# Patient Record
Sex: FEMALE | Race: WHITE | NOT HISPANIC OR LATINO | Employment: FULL TIME | URBAN - METROPOLITAN AREA
[De-identification: names, ages, dates, MRNs, and addresses within clinical notes are randomized per-mention and may not be internally consistent; named-entity substitution may affect disease eponyms.]

---

## 2017-02-08 ENCOUNTER — CONVERSION ENCOUNTER (OUTPATIENT)
Dept: GENERAL RADIOLOGY | Facility: HOSPITAL | Age: 42
End: 2017-02-08

## 2018-06-19 ENCOUNTER — OFFICE VISIT CONVERTED (OUTPATIENT)
Dept: FAMILY MEDICINE CLINIC | Facility: CLINIC | Age: 43
End: 2018-06-19
Attending: PHYSICIAN ASSISTANT

## 2018-12-20 ENCOUNTER — CONVERSION ENCOUNTER (OUTPATIENT)
Dept: FAMILY MEDICINE CLINIC | Facility: CLINIC | Age: 43
End: 2018-12-20

## 2018-12-20 ENCOUNTER — OFFICE VISIT CONVERTED (OUTPATIENT)
Dept: FAMILY MEDICINE CLINIC | Facility: CLINIC | Age: 43
End: 2018-12-20
Attending: PHYSICIAN ASSISTANT

## 2019-01-10 ENCOUNTER — HOSPITAL ENCOUNTER (OUTPATIENT)
Dept: GENERAL RADIOLOGY | Facility: HOSPITAL | Age: 44
Discharge: HOME | End: 2019-01-10
Attending: OBSTETRICS & GYNECOLOGY

## 2019-04-12 ENCOUNTER — HOSPITAL ENCOUNTER (OUTPATIENT)
Dept: LAB | Facility: HOSPITAL | Age: 44
Discharge: HOME | End: 2019-04-12
Attending: PHYSICIAN ASSISTANT

## 2019-04-12 LAB
25(OH)D3 SERPL-MCNC: 22.6 NG/ML (ref 30–100)
ALBUMIN SERPL-MCNC: 4.2 G/DL (ref 3.5–5)
ALBUMIN/GLOB SERPL: 1.2 {RATIO} (ref 1.4–2.6)
ALP SERPL-CCNC: 92 U/L (ref 42–98)
ALT SERPL-CCNC: 30 U/L (ref 10–40)
ANION GAP SERPL CALC-SCNC: 16 MMOL/L (ref 8–19)
APPEARANCE UR: CLEAR
AST SERPL-CCNC: 14 U/L (ref 15–50)
BASOPHILS # BLD AUTO: 0.06 10*3/UL (ref 0–0.2)
BASOPHILS NFR BLD AUTO: 0.6 % (ref 0–3)
BILIRUB SERPL-MCNC: 0.63 MG/DL (ref 0.2–1.3)
BILIRUB UR QL: NEGATIVE
BUN SERPL-MCNC: 11 MG/DL (ref 5–25)
BUN/CREAT SERPL: 18 {RATIO} (ref 6–20)
CALCIUM SERPL-MCNC: 9.1 MG/DL (ref 8.7–10.4)
CHLORIDE SERPL-SCNC: 100 MMOL/L (ref 99–111)
CHOLEST SERPL-MCNC: 173 MG/DL (ref 107–200)
CHOLEST/HDLC SERPL: 3.4 {RATIO} (ref 3–6)
COLOR UR: YELLOW
CONV ABS IMM GRAN: 0.06 10*3/UL (ref 0–0.2)
CONV BACTERIA: NEGATIVE
CONV CO2: 25 MMOL/L (ref 22–32)
CONV COLLECTION SOURCE (UA): ABNORMAL
CONV HYALINE CASTS IN URINE MICRO: ABNORMAL /[LPF]
CONV IMMATURE GRAN: 0.6 % (ref 0–1.8)
CONV TOTAL PROTEIN: 7.6 G/DL (ref 6.3–8.2)
CONV UROBILINOGEN IN URINE BY AUTOMATED TEST STRIP: 1 {EHRLICHU}/DL (ref 0.1–1)
CREAT UR-MCNC: 0.61 MG/DL (ref 0.5–0.9)
DEPRECATED RDW RBC AUTO: 44.2 FL (ref 36.4–46.3)
EOSINOPHIL # BLD AUTO: 0.18 10*3/UL (ref 0–0.7)
EOSINOPHIL # BLD AUTO: 1.8 % (ref 0–7)
ERYTHROCYTE [DISTWIDTH] IN BLOOD BY AUTOMATED COUNT: 13.6 % (ref 11.7–14.4)
GFR SERPLBLD BASED ON 1.73 SQ M-ARVRAT: >60 ML/MIN/{1.73_M2}
GLOBULIN UR ELPH-MCNC: 3.4 G/DL (ref 2–3.5)
GLUCOSE SERPL-MCNC: 96 MG/DL (ref 65–99)
GLUCOSE UR QL: NEGATIVE MG/DL
HBA1C MFR BLD: 14.3 G/DL (ref 12–16)
HCT VFR BLD AUTO: 44.4 % (ref 37–47)
HDLC SERPL-MCNC: 51 MG/DL (ref 40–60)
HGB UR QL STRIP: NEGATIVE
KETONES UR QL STRIP: 40 MG/DL
LDLC SERPL CALC-MCNC: 107 MG/DL (ref 70–100)
LEUKOCYTE ESTERASE UR QL STRIP: ABNORMAL
LYMPHOCYTES # BLD AUTO: 1.8 10*3/UL (ref 1–5)
MAGNESIUM SERPL-MCNC: 2.01 MG/DL (ref 1.6–2.3)
MCH RBC QN AUTO: 28.7 PG (ref 27–31)
MCHC RBC AUTO-ENTMCNC: 32.2 G/DL (ref 33–37)
MCV RBC AUTO: 89.2 FL (ref 81–99)
MONOCYTES # BLD AUTO: 0.85 10*3/UL (ref 0.2–1.2)
MONOCYTES NFR BLD AUTO: 8.7 % (ref 3–10)
NEUTROPHILS # BLD AUTO: 6.87 10*3/UL (ref 2–8)
NEUTROPHILS NFR BLD AUTO: 70 % (ref 30–85)
NITRITE UR QL STRIP: NEGATIVE
NRBC CBCN: 0 % (ref 0–0.7)
OSMOLALITY SERPL CALC.SUM OF ELEC: 283 MOSM/KG (ref 273–304)
PH UR STRIP.AUTO: 6.5 [PH] (ref 5–8)
PLATELET # BLD AUTO: 271 10*3/UL (ref 130–400)
PMV BLD AUTO: 11 FL (ref 9.4–12.3)
POTASSIUM SERPL-SCNC: 4.2 MMOL/L (ref 3.5–5.3)
PROT UR QL: NEGATIVE MG/DL
RBC # BLD AUTO: 4.98 10*6/UL (ref 4.2–5.4)
RBC #/AREA URNS HPF: ABNORMAL /[HPF]
SODIUM SERPL-SCNC: 137 MMOL/L (ref 135–147)
SP GR UR: 1.02 (ref 1–1.03)
SQUAMOUS SPT QL MICRO: ABNORMAL /[HPF]
T4 FREE SERPL-MCNC: 1.5 NG/DL (ref 0.9–1.8)
TRIGL SERPL-MCNC: 75 MG/DL (ref 40–150)
TSH SERPL-ACNC: 2.78 M[IU]/L (ref 0.27–4.2)
URATE SERPL-MCNC: 8 MG/DL (ref 2.5–7.5)
VARIANT LYMPHS NFR BLD MANUAL: 18.3 % (ref 20–45)
VLDLC SERPL-MCNC: 15 MG/DL (ref 5–37)
WBC # BLD AUTO: 9.82 10*3/UL (ref 4.8–10.8)
WBC #/AREA URNS HPF: ABNORMAL /[HPF]

## 2019-06-20 ENCOUNTER — HOSPITAL ENCOUNTER (OUTPATIENT)
Dept: FAMILY MEDICINE CLINIC | Facility: CLINIC | Age: 44
Discharge: HOME | End: 2019-06-20
Attending: PHYSICIAN ASSISTANT

## 2019-06-20 ENCOUNTER — OFFICE VISIT CONVERTED (OUTPATIENT)
Dept: FAMILY MEDICINE CLINIC | Facility: CLINIC | Age: 44
End: 2019-06-20
Attending: PHYSICIAN ASSISTANT

## 2019-06-22 LAB — BACTERIA UR CULT: NORMAL

## 2020-01-31 ENCOUNTER — HOSPITAL ENCOUNTER (OUTPATIENT)
Dept: LAB | Facility: HOSPITAL | Age: 45
Discharge: HOME | End: 2020-01-31
Attending: PHYSICIAN ASSISTANT

## 2020-01-31 LAB
25(OH)D3 SERPL-MCNC: 20.2 NG/ML (ref 30–100)
ALBUMIN SERPL-MCNC: 4.3 G/DL (ref 3.5–5)
ALBUMIN/GLOB SERPL: 1.2 {RATIO} (ref 1.4–2.6)
ALP SERPL-CCNC: 79 U/L (ref 42–98)
ALT SERPL-CCNC: 25 U/L (ref 10–40)
ANION GAP SERPL CALC-SCNC: 19 MMOL/L (ref 8–19)
APPEARANCE UR: CLEAR
AST SERPL-CCNC: 14 U/L (ref 15–50)
BASOPHILS # BLD AUTO: 0.05 10*3/UL (ref 0–0.2)
BASOPHILS NFR BLD AUTO: 0.5 % (ref 0–3)
BILIRUB SERPL-MCNC: 0.53 MG/DL (ref 0.2–1.3)
BILIRUB UR QL: NEGATIVE
BUN SERPL-MCNC: 14 MG/DL (ref 5–25)
BUN/CREAT SERPL: 20 {RATIO} (ref 6–20)
CALCIUM SERPL-MCNC: 9.3 MG/DL (ref 8.7–10.4)
CHLORIDE SERPL-SCNC: 100 MMOL/L (ref 99–111)
CHOLEST SERPL-MCNC: 163 MG/DL (ref 107–200)
CHOLEST/HDLC SERPL: 3.5 {RATIO} (ref 3–6)
COLOR UR: YELLOW
CONV ABS IMM GRAN: 0.05 10*3/UL (ref 0–0.2)
CONV CO2: 22 MMOL/L (ref 22–32)
CONV COLLECTION SOURCE (UA): ABNORMAL
CONV IMMATURE GRAN: 0.5 % (ref 0–1.8)
CONV TOTAL PROTEIN: 7.8 G/DL (ref 6.3–8.2)
CONV UROBILINOGEN IN URINE BY AUTOMATED TEST STRIP: 0.2 {EHRLICHU}/DL (ref 0.1–1)
CREAT UR-MCNC: 0.69 MG/DL (ref 0.5–0.9)
DEPRECATED RDW RBC AUTO: 45.1 FL (ref 36.4–46.3)
EOSINOPHIL # BLD AUTO: 0.12 10*3/UL (ref 0–0.7)
EOSINOPHIL # BLD AUTO: 1.3 % (ref 0–7)
ERYTHROCYTE [DISTWIDTH] IN BLOOD BY AUTOMATED COUNT: 13.6 % (ref 11.7–14.4)
EST. AVERAGE GLUCOSE BLD GHB EST-MCNC: 108 MG/DL
GFR SERPLBLD BASED ON 1.73 SQ M-ARVRAT: >60 ML/MIN/{1.73_M2}
GLOBULIN UR ELPH-MCNC: 3.5 G/DL (ref 2–3.5)
GLUCOSE SERPL-MCNC: 91 MG/DL (ref 65–99)
GLUCOSE UR QL: NEGATIVE MG/DL
HBA1C MFR BLD: 5.4 % (ref 3.5–5.7)
HCT VFR BLD AUTO: 45 % (ref 37–47)
HDLC SERPL-MCNC: 46 MG/DL (ref 40–60)
HGB BLD-MCNC: 14.6 G/DL (ref 12–16)
HGB UR QL STRIP: NEGATIVE
KETONES UR QL STRIP: 15 MG/DL
LDLC SERPL CALC-MCNC: 100 MG/DL (ref 70–100)
LEUKOCYTE ESTERASE UR QL STRIP: NEGATIVE
LYMPHOCYTES # BLD AUTO: 1.85 10*3/UL (ref 1–5)
LYMPHOCYTES NFR BLD AUTO: 19.3 % (ref 20–45)
MCH RBC QN AUTO: 29.3 PG (ref 27–31)
MCHC RBC AUTO-ENTMCNC: 32.4 G/DL (ref 33–37)
MCV RBC AUTO: 90.4 FL (ref 81–99)
MONOCYTES # BLD AUTO: 0.69 10*3/UL (ref 0.2–1.2)
MONOCYTES NFR BLD AUTO: 7.2 % (ref 3–10)
NEUTROPHILS # BLD AUTO: 6.81 10*3/UL (ref 2–8)
NEUTROPHILS NFR BLD AUTO: 71.2 % (ref 30–85)
NITRITE UR QL STRIP: NEGATIVE
NRBC CBCN: 0 % (ref 0–0.7)
OSMOLALITY SERPL CALC.SUM OF ELEC: 284 MOSM/KG (ref 273–304)
PH UR STRIP.AUTO: 6.5 [PH] (ref 5–8)
PLATELET # BLD AUTO: 275 10*3/UL (ref 130–400)
PMV BLD AUTO: 11.1 FL (ref 9.4–12.3)
POTASSIUM SERPL-SCNC: 4.4 MMOL/L (ref 3.5–5.3)
PROT UR QL: NEGATIVE MG/DL
RBC # BLD AUTO: 4.98 10*6/UL (ref 4.2–5.4)
SODIUM SERPL-SCNC: 137 MMOL/L (ref 135–147)
SP GR UR: 1.01 (ref 1–1.03)
T4 FREE SERPL-MCNC: 1.4 NG/DL (ref 0.9–1.8)
TRIGL SERPL-MCNC: 83 MG/DL (ref 40–150)
TSH SERPL-ACNC: 1.94 M[IU]/L (ref 0.27–4.2)
VLDLC SERPL-MCNC: 17 MG/DL (ref 5–37)
WBC # BLD AUTO: 9.57 10*3/UL (ref 4.8–10.8)

## 2020-02-04 ENCOUNTER — OFFICE VISIT CONVERTED (OUTPATIENT)
Dept: FAMILY MEDICINE CLINIC | Facility: CLINIC | Age: 45
End: 2020-02-04
Attending: PHYSICIAN ASSISTANT

## 2020-02-04 ENCOUNTER — CONVERSION ENCOUNTER (OUTPATIENT)
Dept: FAMILY MEDICINE CLINIC | Facility: CLINIC | Age: 45
End: 2020-02-04

## 2020-02-04 LAB — T3REVERSE SERPL-MCNC: 28.5 NG/DL (ref 9.2–24.1)

## 2020-02-25 ENCOUNTER — HOSPITAL ENCOUNTER (OUTPATIENT)
Dept: GENERAL RADIOLOGY | Facility: HOSPITAL | Age: 45
Discharge: HOME | End: 2020-02-25
Attending: PHYSICIAN ASSISTANT

## 2020-08-04 ENCOUNTER — OFFICE VISIT CONVERTED (OUTPATIENT)
Dept: FAMILY MEDICINE CLINIC | Facility: CLINIC | Age: 45
End: 2020-08-04
Attending: PHYSICIAN ASSISTANT

## 2020-09-18 ENCOUNTER — HOSPITAL ENCOUNTER (OUTPATIENT)
Dept: LAB | Facility: HOSPITAL | Age: 45
Discharge: HOME | End: 2020-09-18
Attending: PHYSICIAN ASSISTANT

## 2020-09-18 LAB
25(OH)D3 SERPL-MCNC: 25.9 NG/ML (ref 30–100)
ALBUMIN SERPL-MCNC: 4.2 G/DL (ref 3.5–5)
ALBUMIN/GLOB SERPL: 1.4 {RATIO} (ref 1.4–2.6)
ALP SERPL-CCNC: 84 U/L (ref 42–98)
ALT SERPL-CCNC: 20 U/L (ref 10–40)
ANION GAP SERPL CALC-SCNC: 15 MMOL/L (ref 8–19)
APPEARANCE UR: CLEAR
AST SERPL-CCNC: 12 U/L (ref 15–50)
BASOPHILS # BLD AUTO: 0.05 10*3/UL (ref 0–0.2)
BASOPHILS NFR BLD AUTO: 0.5 % (ref 0–3)
BILIRUB SERPL-MCNC: 0.7 MG/DL (ref 0.2–1.3)
BILIRUB UR QL: NEGATIVE
BUN SERPL-MCNC: 11 MG/DL (ref 5–25)
BUN/CREAT SERPL: 15 {RATIO} (ref 6–20)
CALCIUM SERPL-MCNC: 9.4 MG/DL (ref 8.7–10.4)
CHLORIDE SERPL-SCNC: 101 MMOL/L (ref 99–111)
CHOLEST SERPL-MCNC: 162 MG/DL (ref 107–200)
CHOLEST/HDLC SERPL: 3.4 {RATIO} (ref 3–6)
COLOR UR: YELLOW
CONV ABS IMM GRAN: 0.03 10*3/UL (ref 0–0.2)
CONV CO2: 26 MMOL/L (ref 22–32)
CONV COLLECTION SOURCE (UA): NORMAL
CONV IMMATURE GRAN: 0.3 % (ref 0–1.8)
CONV TOTAL PROTEIN: 7.2 G/DL (ref 6.3–8.2)
CONV UROBILINOGEN IN URINE BY AUTOMATED TEST STRIP: 0.2 {EHRLICHU}/DL (ref 0.1–1)
CREAT UR-MCNC: 0.74 MG/DL (ref 0.5–0.9)
DEPRECATED RDW RBC AUTO: 43.8 FL (ref 36.4–46.3)
EOSINOPHIL # BLD AUTO: 0.19 10*3/UL (ref 0–0.7)
EOSINOPHIL # BLD AUTO: 2 % (ref 0–7)
ERYTHROCYTE [DISTWIDTH] IN BLOOD BY AUTOMATED COUNT: 13.2 % (ref 11.7–14.4)
EST. AVERAGE GLUCOSE BLD GHB EST-MCNC: 114 MG/DL
GFR SERPLBLD BASED ON 1.73 SQ M-ARVRAT: >60 ML/MIN/{1.73_M2}
GLOBULIN UR ELPH-MCNC: 3 G/DL (ref 2–3.5)
GLUCOSE SERPL-MCNC: 97 MG/DL (ref 65–99)
GLUCOSE UR QL: NEGATIVE MG/DL
HBA1C MFR BLD: 5.6 % (ref 3.5–5.7)
HCT VFR BLD AUTO: 43.7 % (ref 37–47)
HDLC SERPL-MCNC: 47 MG/DL (ref 40–60)
HGB BLD-MCNC: 13.8 G/DL (ref 12–16)
HGB UR QL STRIP: NEGATIVE
KETONES UR QL STRIP: NEGATIVE MG/DL
LDLC SERPL CALC-MCNC: 97 MG/DL (ref 70–100)
LEUKOCYTE ESTERASE UR QL STRIP: NEGATIVE
LYMPHOCYTES # BLD AUTO: 1.69 10*3/UL (ref 1–5)
LYMPHOCYTES NFR BLD AUTO: 18.1 % (ref 20–45)
MCH RBC QN AUTO: 28.8 PG (ref 27–31)
MCHC RBC AUTO-ENTMCNC: 31.6 G/DL (ref 33–37)
MCV RBC AUTO: 91 FL (ref 81–99)
MONOCYTES # BLD AUTO: 0.75 10*3/UL (ref 0.2–1.2)
MONOCYTES NFR BLD AUTO: 8 % (ref 3–10)
NEUTROPHILS # BLD AUTO: 6.62 10*3/UL (ref 2–8)
NEUTROPHILS NFR BLD AUTO: 71.1 % (ref 30–85)
NITRITE UR QL STRIP: NEGATIVE
NRBC CBCN: 0 % (ref 0–0.7)
OSMOLALITY SERPL CALC.SUM OF ELEC: 285 MOSM/KG (ref 273–304)
PH UR STRIP.AUTO: 7 [PH] (ref 5–8)
PLATELET # BLD AUTO: 247 10*3/UL (ref 130–400)
PMV BLD AUTO: 11.1 FL (ref 9.4–12.3)
POTASSIUM SERPL-SCNC: 4.1 MMOL/L (ref 3.5–5.3)
PROT UR QL: NEGATIVE MG/DL
RBC # BLD AUTO: 4.8 10*6/UL (ref 4.2–5.4)
SODIUM SERPL-SCNC: 138 MMOL/L (ref 135–147)
SP GR UR: 1.02 (ref 1–1.03)
T4 FREE SERPL-MCNC: 1.7 NG/DL (ref 0.9–1.8)
TRIGL SERPL-MCNC: 90 MG/DL (ref 40–150)
TSH SERPL-ACNC: 2.28 M[IU]/L (ref 0.27–4.2)
URATE SERPL-MCNC: 6.3 MG/DL (ref 2.5–7.5)
VLDLC SERPL-MCNC: 18 MG/DL (ref 5–37)
WBC # BLD AUTO: 9.33 10*3/UL (ref 4.8–10.8)

## 2020-09-29 ENCOUNTER — OFFICE VISIT CONVERTED (OUTPATIENT)
Dept: FAMILY MEDICINE CLINIC | Facility: CLINIC | Age: 45
End: 2020-09-29
Attending: PHYSICIAN ASSISTANT

## 2020-10-22 ENCOUNTER — OFFICE VISIT CONVERTED (OUTPATIENT)
Dept: FAMILY MEDICINE CLINIC | Facility: CLINIC | Age: 45
End: 2020-10-22
Attending: NURSE PRACTITIONER

## 2020-11-23 ENCOUNTER — CONVERSION ENCOUNTER (OUTPATIENT)
Dept: FAMILY MEDICINE CLINIC | Facility: CLINIC | Age: 45
End: 2020-11-23

## 2020-11-23 ENCOUNTER — TELEMEDICINE CONVERTED (OUTPATIENT)
Dept: FAMILY MEDICINE CLINIC | Facility: CLINIC | Age: 45
End: 2020-11-23
Attending: PHYSICIAN ASSISTANT

## 2020-12-18 ENCOUNTER — CONVERSION ENCOUNTER (OUTPATIENT)
Dept: FAMILY MEDICINE CLINIC | Facility: CLINIC | Age: 45
End: 2020-12-18

## 2020-12-18 ENCOUNTER — OFFICE VISIT CONVERTED (OUTPATIENT)
Dept: FAMILY MEDICINE CLINIC | Facility: CLINIC | Age: 45
End: 2020-12-18
Attending: PHYSICIAN ASSISTANT

## 2021-01-11 PROBLEM — R12 HEARTBURN: Status: ACTIVE | Noted: 2021-01-11

## 2021-01-11 PROBLEM — M25.473 ANKLE SWELLING: Status: ACTIVE | Noted: 2021-01-11

## 2021-01-11 PROBLEM — D64.9 ANEMIA: Status: ACTIVE | Noted: 2021-01-11

## 2021-01-11 PROBLEM — F41.9 ANXIETY: Status: ACTIVE | Noted: 2021-01-11

## 2021-01-11 PROBLEM — M25.50 MULTIPLE JOINT PAIN: Status: ACTIVE | Noted: 2021-01-11

## 2021-01-11 PROBLEM — R06.83 SNORING: Status: ACTIVE | Noted: 2021-01-11

## 2021-01-11 PROBLEM — R63.5 WEIGHT GAIN: Status: ACTIVE | Noted: 2021-01-11

## 2021-01-11 PROBLEM — I10 HIGH BLOOD PRESSURE: Status: ACTIVE | Noted: 2021-01-11

## 2021-01-11 PROBLEM — E03.9 HYPOTHYROID: Status: ACTIVE | Noted: 2021-01-11

## 2021-01-18 PROBLEM — M72.2 PLANTAR FASCIITIS: Status: ACTIVE | Noted: 2021-01-18

## 2021-01-18 PROBLEM — M79.673 HEEL PAIN: Status: ACTIVE | Noted: 2021-01-18

## 2021-01-18 PROBLEM — M25.579 ANKLE PAIN: Status: ACTIVE | Noted: 2021-01-18

## 2021-01-18 PROBLEM — R53.82 CHRONIC FATIGUE: Status: ACTIVE | Noted: 2021-01-18

## 2021-01-19 ENCOUNTER — TELEMEDICINE CONVERTED (OUTPATIENT)
Dept: FAMILY MEDICINE CLINIC | Facility: CLINIC | Age: 46
End: 2021-01-19
Attending: PHYSICIAN ASSISTANT

## 2021-01-20 ENCOUNTER — LAB (OUTPATIENT)
Dept: LAB | Facility: HOSPITAL | Age: 46
End: 2021-01-20

## 2021-01-20 ENCOUNTER — HOSPITAL ENCOUNTER (OUTPATIENT)
Dept: GENERAL RADIOLOGY | Facility: HOSPITAL | Age: 46
Discharge: HOME | End: 2021-01-20

## 2021-01-20 ENCOUNTER — HOSPITAL ENCOUNTER (OUTPATIENT)
Dept: CARDIOLOGY | Facility: HOSPITAL | Age: 46
Discharge: HOME | End: 2021-01-20

## 2021-01-20 ENCOUNTER — CONSULT (OUTPATIENT)
Dept: BARIATRICS/WEIGHT MGMT | Facility: CLINIC | Age: 46
End: 2021-01-20

## 2021-01-20 ENCOUNTER — OFFICE VISIT (OUTPATIENT)
Dept: BARIATRICS/WEIGHT MGMT | Facility: CLINIC | Age: 46
End: 2021-01-20

## 2021-01-20 ENCOUNTER — TRANSCRIBE ORDERS (OUTPATIENT)
Dept: CARDIOLOGY | Facility: HOSPITAL | Age: 46
End: 2021-01-20

## 2021-01-20 VITALS
WEIGHT: 289 LBS | BODY MASS INDEX: 46.45 KG/M2 | TEMPERATURE: 97.7 F | HEIGHT: 66 IN | DIASTOLIC BLOOD PRESSURE: 94 MMHG | SYSTOLIC BLOOD PRESSURE: 147 MMHG | RESPIRATION RATE: 18 BRPM | HEART RATE: 77 BPM

## 2021-01-20 DIAGNOSIS — E66.01 OBESITY, CLASS III, BMI 40-49.9 (MORBID OBESITY) (HCC): Primary | ICD-10-CM

## 2021-01-20 DIAGNOSIS — I10 ESSENTIAL HYPERTENSION: ICD-10-CM

## 2021-01-20 DIAGNOSIS — K21.9 GASTROESOPHAGEAL REFLUX DISEASE, UNSPECIFIED WHETHER ESOPHAGITIS PRESENT: ICD-10-CM

## 2021-01-20 DIAGNOSIS — Z01.818 PRE-OP EVALUATION: ICD-10-CM

## 2021-01-20 DIAGNOSIS — Z01.811 PRE-OP CHEST EXAM: Primary | ICD-10-CM

## 2021-01-20 DIAGNOSIS — E66.01 OBESITY, CLASS III, BMI 40-49.9 (MORBID OBESITY) (HCC): ICD-10-CM

## 2021-01-20 DIAGNOSIS — E03.9 HYPOTHYROIDISM, UNSPECIFIED TYPE: ICD-10-CM

## 2021-01-20 PROBLEM — Z71.3 DIETARY COUNSELING: Status: ACTIVE | Noted: 2021-01-20

## 2021-01-20 PROBLEM — R06.83 SNORING: Status: RESOLVED | Noted: 2021-01-11 | Resolved: 2021-01-20

## 2021-01-20 PROBLEM — E66.813 OBESITY, CLASS III, BMI 40-49.9 (MORBID OBESITY): Status: ACTIVE | Noted: 2021-01-20

## 2021-01-20 PROBLEM — R63.5 WEIGHT GAIN: Status: RESOLVED | Noted: 2021-01-11 | Resolved: 2021-01-20

## 2021-01-20 PROBLEM — M25.579 ANKLE PAIN: Status: RESOLVED | Noted: 2021-01-18 | Resolved: 2021-01-20

## 2021-01-20 PROBLEM — M25.473 ANKLE SWELLING: Status: RESOLVED | Noted: 2021-01-11 | Resolved: 2021-01-20

## 2021-01-20 PROBLEM — M79.673 HEEL PAIN: Status: RESOLVED | Noted: 2021-01-18 | Resolved: 2021-01-20

## 2021-01-20 LAB
ALBUMIN SERPL-MCNC: 4.3 G/DL (ref 3.5–5.2)
ALBUMIN/GLOB SERPL: 1.3 G/DL
ALP SERPL-CCNC: 89 U/L (ref 39–117)
ALT SERPL W P-5'-P-CCNC: 28 U/L (ref 1–33)
ANION GAP SERPL CALCULATED.3IONS-SCNC: 9 MMOL/L (ref 5–15)
AST SERPL-CCNC: 17 U/L (ref 1–32)
BASOPHILS # BLD AUTO: 0.05 10*3/MM3 (ref 0–0.2)
BASOPHILS NFR BLD AUTO: 0.6 % (ref 0–1.5)
BILIRUB SERPL-MCNC: 0.6 MG/DL (ref 0–1.2)
BUN SERPL-MCNC: 12 MG/DL (ref 6–20)
BUN/CREAT SERPL: 17.1 (ref 7–25)
CALCIUM SPEC-SCNC: 9.2 MG/DL (ref 8.6–10.5)
CHLORIDE SERPL-SCNC: 102 MMOL/L (ref 98–107)
CHOLEST SERPL-MCNC: 149 MG/DL (ref 0–200)
CO2 SERPL-SCNC: 27 MMOL/L (ref 22–29)
CREAT SERPL-MCNC: 0.7 MG/DL (ref 0.57–1)
DEPRECATED RDW RBC AUTO: 40 FL (ref 37–54)
EOSINOPHIL # BLD AUTO: 0.15 10*3/MM3 (ref 0–0.4)
EOSINOPHIL NFR BLD AUTO: 1.7 % (ref 0.3–6.2)
ERYTHROCYTE [DISTWIDTH] IN BLOOD BY AUTOMATED COUNT: 12.5 % (ref 12.3–15.4)
GFR SERPL CREATININE-BSD FRML MDRD: 90 ML/MIN/1.73
GLOBULIN UR ELPH-MCNC: 3.2 GM/DL
GLUCOSE SERPL-MCNC: 90 MG/DL (ref 65–99)
HBA1C MFR BLD: 5.5 % (ref 4.8–5.6)
HCT VFR BLD AUTO: 42.7 % (ref 34–46.6)
HDLC SERPL-MCNC: 50 MG/DL (ref 40–60)
HGB BLD-MCNC: 14.3 G/DL (ref 12–15.9)
IMM GRANULOCYTES # BLD AUTO: 0.05 10*3/MM3 (ref 0–0.05)
IMM GRANULOCYTES NFR BLD AUTO: 0.6 % (ref 0–0.5)
LDLC SERPL CALC-MCNC: 82 MG/DL (ref 0–100)
LDLC/HDLC SERPL: 1.63 {RATIO}
LYMPHOCYTES # BLD AUTO: 1.91 10*3/MM3 (ref 0.7–3.1)
LYMPHOCYTES NFR BLD AUTO: 21.3 % (ref 19.6–45.3)
MCH RBC QN AUTO: 29.5 PG (ref 26.6–33)
MCHC RBC AUTO-ENTMCNC: 33.5 G/DL (ref 31.5–35.7)
MCV RBC AUTO: 88.2 FL (ref 79–97)
MONOCYTES # BLD AUTO: 0.71 10*3/MM3 (ref 0.1–0.9)
MONOCYTES NFR BLD AUTO: 7.9 % (ref 5–12)
NEUTROPHILS NFR BLD AUTO: 6.08 10*3/MM3 (ref 1.7–7)
NEUTROPHILS NFR BLD AUTO: 67.9 % (ref 42.7–76)
NRBC BLD AUTO-RTO: 0 /100 WBC (ref 0–0.2)
PLATELET # BLD AUTO: 264 10*3/MM3 (ref 140–450)
PMV BLD AUTO: 10.5 FL (ref 6–12)
POTASSIUM SERPL-SCNC: 3.9 MMOL/L (ref 3.5–5.2)
PROT SERPL-MCNC: 7.5 G/DL (ref 6–8.5)
RBC # BLD AUTO: 4.84 10*6/MM3 (ref 3.77–5.28)
SODIUM SERPL-SCNC: 138 MMOL/L (ref 136–145)
TRIGL SERPL-MCNC: 88 MG/DL (ref 0–150)
TSH SERPL DL<=0.05 MIU/L-ACNC: 1.8 UIU/ML (ref 0.27–4.2)
VLDLC SERPL-MCNC: 17 MG/DL (ref 5–40)
WBC # BLD AUTO: 8.95 10*3/MM3 (ref 3.4–10.8)

## 2021-01-20 PROCEDURE — 71046 X-RAY EXAM CHEST 2 VIEWS: CPT

## 2021-01-20 PROCEDURE — 93010 ELECTROCARDIOGRAM REPORT: CPT | Performed by: INTERNAL MEDICINE

## 2021-01-20 PROCEDURE — 80061 LIPID PANEL: CPT | Performed by: NURSE PRACTITIONER

## 2021-01-20 PROCEDURE — 36415 COLL VENOUS BLD VENIPUNCTURE: CPT | Performed by: NURSE PRACTITIONER

## 2021-01-20 PROCEDURE — 83036 HEMOGLOBIN GLYCOSYLATED A1C: CPT | Performed by: NURSE PRACTITIONER

## 2021-01-20 PROCEDURE — 99214 OFFICE O/P EST MOD 30 MIN: CPT | Performed by: NURSE PRACTITIONER

## 2021-01-20 PROCEDURE — 93005 ELECTROCARDIOGRAM TRACING: CPT

## 2021-01-20 PROCEDURE — 80053 COMPREHEN METABOLIC PANEL: CPT | Performed by: NURSE PRACTITIONER

## 2021-01-20 PROCEDURE — 85025 COMPLETE CBC W/AUTO DIFF WBC: CPT | Performed by: NURSE PRACTITIONER

## 2021-01-20 PROCEDURE — 84443 ASSAY THYROID STIM HORMONE: CPT | Performed by: NURSE PRACTITIONER

## 2021-01-20 RX ORDER — FAMOTIDINE 20 MG/1
10 TABLET, FILM COATED ORAL NIGHTLY
COMMUNITY
End: 2022-05-23 | Stop reason: SDUPTHER

## 2021-01-20 RX ORDER — VITAMIN B COMPLEX
CAPSULE ORAL DAILY
COMMUNITY
End: 2021-05-04

## 2021-01-21 ENCOUNTER — TRANSCRIBE ORDERS (OUTPATIENT)
Dept: ADMINISTRATIVE | Facility: HOSPITAL | Age: 46
End: 2021-01-21

## 2021-01-21 ENCOUNTER — TELEPHONE (OUTPATIENT)
Dept: BARIATRICS/WEIGHT MGMT | Facility: CLINIC | Age: 46
End: 2021-01-21

## 2021-01-21 DIAGNOSIS — Z01.818 OTHER SPECIFIED PRE-OPERATIVE EXAMINATION: Primary | ICD-10-CM

## 2021-01-21 LAB — QT INTERVAL: 441 MS

## 2021-02-24 PROBLEM — K21.9 GASTROESOPHAGEAL REFLUX DISEASE: Status: ACTIVE | Noted: 2021-01-20

## 2021-02-27 ENCOUNTER — LAB (OUTPATIENT)
Dept: LAB | Facility: HOSPITAL | Age: 46
End: 2021-02-27

## 2021-02-27 DIAGNOSIS — Z01.818 OTHER SPECIFIED PRE-OPERATIVE EXAMINATION: ICD-10-CM

## 2021-02-27 PROCEDURE — C9803 HOPD COVID-19 SPEC COLLECT: HCPCS

## 2021-02-27 PROCEDURE — U0004 COV-19 TEST NON-CDC HGH THRU: HCPCS

## 2021-03-01 LAB — SARS-COV-2 RNA RESP QL NAA+PROBE: NOT DETECTED

## 2021-03-02 ENCOUNTER — ANESTHESIA EVENT (OUTPATIENT)
Dept: GASTROENTEROLOGY | Facility: HOSPITAL | Age: 46
End: 2021-03-02

## 2021-03-02 ENCOUNTER — HOSPITAL ENCOUNTER (OUTPATIENT)
Facility: HOSPITAL | Age: 46
Setting detail: HOSPITAL OUTPATIENT SURGERY
Discharge: HOME | End: 2021-03-02
Attending: SURGERY | Admitting: SURGERY

## 2021-03-02 ENCOUNTER — ANESTHESIA (OUTPATIENT)
Dept: GASTROENTEROLOGY | Facility: HOSPITAL | Age: 46
End: 2021-03-02

## 2021-03-02 VITALS
RESPIRATION RATE: 12 BRPM | BODY MASS INDEX: 46.45 KG/M2 | HEART RATE: 64 BPM | WEIGHT: 289 LBS | DIASTOLIC BLOOD PRESSURE: 81 MMHG | HEIGHT: 66 IN | OXYGEN SATURATION: 92 % | SYSTOLIC BLOOD PRESSURE: 131 MMHG

## 2021-03-02 DIAGNOSIS — E66.01 OBESITY, CLASS III, BMI 40-49.9 (MORBID OBESITY) (HCC): ICD-10-CM

## 2021-03-02 DIAGNOSIS — Z01.818 PRE-OP EVALUATION: ICD-10-CM

## 2021-03-02 DIAGNOSIS — K21.9 GASTROESOPHAGEAL REFLUX DISEASE, UNSPECIFIED WHETHER ESOPHAGITIS PRESENT: ICD-10-CM

## 2021-03-02 PROBLEM — K44.9 HIATAL HERNIA: Status: ACTIVE | Noted: 2021-03-02

## 2021-03-02 PROCEDURE — 25010000002 PROPOFOL 10 MG/ML EMULSION: Performed by: ANESTHESIOLOGY

## 2021-03-02 PROCEDURE — 43239 EGD BIOPSY SINGLE/MULTIPLE: CPT | Performed by: SURGERY

## 2021-03-02 PROCEDURE — 87081 CULTURE SCREEN ONLY: CPT | Performed by: SURGERY

## 2021-03-02 RX ORDER — LIDOCAINE HYDROCHLORIDE 20 MG/ML
INJECTION, SOLUTION INFILTRATION; PERINEURAL AS NEEDED
Status: DISCONTINUED | OUTPATIENT
Start: 2021-03-02 | End: 2021-03-02 | Stop reason: SURG

## 2021-03-02 RX ORDER — PROPOFOL 10 MG/ML
VIAL (ML) INTRAVENOUS AS NEEDED
Status: DISCONTINUED | OUTPATIENT
Start: 2021-03-02 | End: 2021-03-02 | Stop reason: SURG

## 2021-03-02 RX ORDER — SODIUM CHLORIDE, SODIUM LACTATE, POTASSIUM CHLORIDE, CALCIUM CHLORIDE 600; 310; 30; 20 MG/100ML; MG/100ML; MG/100ML; MG/100ML
1000 INJECTION, SOLUTION INTRAVENOUS CONTINUOUS
Status: DISCONTINUED | OUTPATIENT
Start: 2021-03-02 | End: 2021-03-02 | Stop reason: HOSPADM

## 2021-03-02 RX ADMIN — LIDOCAINE HYDROCHLORIDE 60 MG: 20 INJECTION, SOLUTION INFILTRATION; PERINEURAL at 08:17

## 2021-03-02 RX ADMIN — SODIUM CHLORIDE, POTASSIUM CHLORIDE, SODIUM LACTATE AND CALCIUM CHLORIDE 1000 ML: 600; 310; 30; 20 INJECTION, SOLUTION INTRAVENOUS at 08:12

## 2021-03-02 RX ADMIN — PROPOFOL 150 MG: 10 INJECTION, EMULSION INTRAVENOUS at 08:16

## 2021-03-03 LAB — UREASE TISS QL: NEGATIVE

## 2021-03-05 ENCOUNTER — TELEPHONE (OUTPATIENT)
Dept: BARIATRICS/WEIGHT MGMT | Facility: CLINIC | Age: 46
End: 2021-03-05

## 2021-03-05 ENCOUNTER — PREP FOR SURGERY (OUTPATIENT)
Dept: OTHER | Facility: HOSPITAL | Age: 46
End: 2021-03-05

## 2021-03-05 DIAGNOSIS — K44.9 HIATAL HERNIA: ICD-10-CM

## 2021-03-05 DIAGNOSIS — E66.01 OBESITY, CLASS III, BMI 40-49.9 (MORBID OBESITY) (HCC): Primary | ICD-10-CM

## 2021-03-05 RX ORDER — ACETAMINOPHEN 160 MG/5ML
975 SOLUTION ORAL ONCE
Status: CANCELLED | OUTPATIENT
Start: 2021-03-29 | End: 2021-03-05

## 2021-03-05 RX ORDER — SODIUM CHLORIDE, SODIUM LACTATE, POTASSIUM CHLORIDE, CALCIUM CHLORIDE 600; 310; 30; 20 MG/100ML; MG/100ML; MG/100ML; MG/100ML
100 INJECTION, SOLUTION INTRAVENOUS CONTINUOUS
Status: CANCELLED | OUTPATIENT
Start: 2021-03-29

## 2021-03-05 RX ORDER — SODIUM CHLORIDE 0.9 % (FLUSH) 0.9 %
3-10 SYRINGE (ML) INJECTION AS NEEDED
Status: CANCELLED | OUTPATIENT
Start: 2021-03-29

## 2021-03-05 RX ORDER — METOCLOPRAMIDE HYDROCHLORIDE 5 MG/ML
10 INJECTION INTRAMUSCULAR; INTRAVENOUS ONCE
Status: CANCELLED | OUTPATIENT
Start: 2021-03-29 | End: 2021-03-05

## 2021-03-05 RX ORDER — PROMETHAZINE HYDROCHLORIDE 25 MG/1
25 SUPPOSITORY RECTAL ONCE
Status: CANCELLED | OUTPATIENT
Start: 2021-03-29

## 2021-03-05 RX ORDER — CEFAZOLIN SODIUM IN 0.9 % NACL 3 G/100 ML
3 INTRAVENOUS SOLUTION, PIGGYBACK (ML) INTRAVENOUS
Status: CANCELLED | OUTPATIENT
Start: 2021-03-29

## 2021-03-05 RX ORDER — SODIUM CHLORIDE 0.9 % (FLUSH) 0.9 %
3 SYRINGE (ML) INJECTION EVERY 12 HOURS SCHEDULED
Status: CANCELLED | OUTPATIENT
Start: 2021-03-05

## 2021-03-05 RX ORDER — PANTOPRAZOLE SODIUM 40 MG/10ML
40 INJECTION, POWDER, LYOPHILIZED, FOR SOLUTION INTRAVENOUS ONCE
Status: CANCELLED | OUTPATIENT
Start: 2021-03-29 | End: 2021-03-05

## 2021-03-05 RX ORDER — PROMETHAZINE HYDROCHLORIDE 25 MG/1
12.5 TABLET ORAL ONCE
Status: CANCELLED | OUTPATIENT
Start: 2021-03-29 | End: 2021-03-05

## 2021-03-05 RX ORDER — CHLORHEXIDINE GLUCONATE 0.12 MG/ML
15 RINSE ORAL SEE ADMIN INSTRUCTIONS
Status: CANCELLED | OUTPATIENT
Start: 2021-03-29

## 2021-03-05 RX ORDER — SCOLOPAMINE TRANSDERMAL SYSTEM 1 MG/1
1 PATCH, EXTENDED RELEASE TRANSDERMAL CONTINUOUS
Status: CANCELLED | OUTPATIENT
Start: 2021-03-29 | End: 2021-04-01

## 2021-03-11 ENCOUNTER — LAB (OUTPATIENT)
Dept: LAB | Facility: HOSPITAL | Age: 46
End: 2021-03-11

## 2021-03-11 ENCOUNTER — CONSULT (OUTPATIENT)
Dept: BARIATRICS/WEIGHT MGMT | Facility: CLINIC | Age: 46
End: 2021-03-11

## 2021-03-11 VITALS
BODY MASS INDEX: 46.61 KG/M2 | HEART RATE: 85 BPM | HEIGHT: 66 IN | RESPIRATION RATE: 18 BRPM | SYSTOLIC BLOOD PRESSURE: 178 MMHG | DIASTOLIC BLOOD PRESSURE: 100 MMHG | TEMPERATURE: 97.7 F | WEIGHT: 290 LBS

## 2021-03-11 DIAGNOSIS — K44.9 HIATAL HERNIA: ICD-10-CM

## 2021-03-11 DIAGNOSIS — R53.82 CHRONIC FATIGUE: ICD-10-CM

## 2021-03-11 DIAGNOSIS — M25.50 MULTIPLE JOINT PAIN: ICD-10-CM

## 2021-03-11 DIAGNOSIS — K21.9 GASTROESOPHAGEAL REFLUX DISEASE, UNSPECIFIED WHETHER ESOPHAGITIS PRESENT: ICD-10-CM

## 2021-03-11 DIAGNOSIS — Z71.3 DIETARY COUNSELING: ICD-10-CM

## 2021-03-11 DIAGNOSIS — E66.01 OBESITY, CLASS III, BMI 40-49.9 (MORBID OBESITY) (HCC): Primary | ICD-10-CM

## 2021-03-11 DIAGNOSIS — E03.9 HYPOTHYROIDISM, UNSPECIFIED TYPE: ICD-10-CM

## 2021-03-11 DIAGNOSIS — E66.01 OBESITY, CLASS III, BMI 40-49.9 (MORBID OBESITY) (HCC): ICD-10-CM

## 2021-03-11 DIAGNOSIS — D50.8 IRON DEFICIENCY ANEMIA SECONDARY TO INADEQUATE DIETARY IRON INTAKE: ICD-10-CM

## 2021-03-11 DIAGNOSIS — I10 ESSENTIAL HYPERTENSION: ICD-10-CM

## 2021-03-11 PROBLEM — Z01.818 PRE-OP EVALUATION: Status: RESOLVED | Noted: 2021-01-20 | Resolved: 2021-03-11

## 2021-03-11 LAB
ALBUMIN SERPL-MCNC: 4.2 G/DL (ref 3.5–5.2)
ALBUMIN/GLOB SERPL: 1.2 G/DL
ALP SERPL-CCNC: 94 U/L (ref 39–117)
ALT SERPL W P-5'-P-CCNC: 24 U/L (ref 1–33)
ANION GAP SERPL CALCULATED.3IONS-SCNC: 10.5 MMOL/L (ref 5–15)
AST SERPL-CCNC: 15 U/L (ref 1–32)
BILIRUB SERPL-MCNC: 0.8 MG/DL (ref 0–1.2)
BUN SERPL-MCNC: 11 MG/DL (ref 6–20)
BUN/CREAT SERPL: 17.7 (ref 7–25)
CALCIUM SPEC-SCNC: 9.2 MG/DL (ref 8.6–10.5)
CHLORIDE SERPL-SCNC: 100 MMOL/L (ref 98–107)
CO2 SERPL-SCNC: 25.5 MMOL/L (ref 22–29)
CREAT SERPL-MCNC: 0.62 MG/DL (ref 0.57–1)
DEPRECATED RDW RBC AUTO: 40.8 FL (ref 37–54)
ERYTHROCYTE [DISTWIDTH] IN BLOOD BY AUTOMATED COUNT: 12.5 % (ref 12.3–15.4)
GFR SERPL CREATININE-BSD FRML MDRD: 104 ML/MIN/1.73
GLOBULIN UR ELPH-MCNC: 3.5 GM/DL
GLUCOSE SERPL-MCNC: 114 MG/DL (ref 65–99)
HCT VFR BLD AUTO: 44 % (ref 34–46.6)
HGB BLD-MCNC: 14.6 G/DL (ref 12–15.9)
MCH RBC QN AUTO: 29.7 PG (ref 26.6–33)
MCHC RBC AUTO-ENTMCNC: 33.2 G/DL (ref 31.5–35.7)
MCV RBC AUTO: 89.4 FL (ref 79–97)
PLATELET # BLD AUTO: 274 10*3/MM3 (ref 140–450)
PMV BLD AUTO: 10.4 FL (ref 6–12)
POTASSIUM SERPL-SCNC: 4 MMOL/L (ref 3.5–5.2)
PROT SERPL-MCNC: 7.7 G/DL (ref 6–8.5)
RBC # BLD AUTO: 4.92 10*6/MM3 (ref 3.77–5.28)
SODIUM SERPL-SCNC: 136 MMOL/L (ref 136–145)
WBC # BLD AUTO: 15.68 10*3/MM3 (ref 3.4–10.8)

## 2021-03-11 PROCEDURE — 80053 COMPREHEN METABOLIC PANEL: CPT

## 2021-03-11 PROCEDURE — 85027 COMPLETE CBC AUTOMATED: CPT

## 2021-03-11 PROCEDURE — 99215 OFFICE O/P EST HI 40 MIN: CPT | Performed by: SURGERY

## 2021-03-11 PROCEDURE — 36415 COLL VENOUS BLD VENIPUNCTURE: CPT

## 2021-03-15 ENCOUNTER — TELEPHONE (OUTPATIENT)
Dept: BARIATRICS/WEIGHT MGMT | Facility: CLINIC | Age: 46
End: 2021-03-15

## 2021-03-26 ENCOUNTER — APPOINTMENT (OUTPATIENT)
Dept: PREADMISSION TESTING | Facility: HOSPITAL | Age: 46
End: 2021-03-26

## 2021-03-26 VITALS
HEART RATE: 77 BPM | DIASTOLIC BLOOD PRESSURE: 85 MMHG | OXYGEN SATURATION: 100 % | RESPIRATION RATE: 16 BRPM | TEMPERATURE: 97.6 F | SYSTOLIC BLOOD PRESSURE: 127 MMHG

## 2021-03-26 LAB
ANION GAP SERPL CALCULATED.3IONS-SCNC: 10.1 MMOL/L (ref 5–15)
BUN SERPL-MCNC: 15 MG/DL (ref 6–20)
BUN/CREAT SERPL: 18.5 (ref 7–25)
CALCIUM SPEC-SCNC: 9.9 MG/DL (ref 8.6–10.5)
CHLORIDE SERPL-SCNC: 100 MMOL/L (ref 98–107)
CO2 SERPL-SCNC: 27.9 MMOL/L (ref 22–29)
CREAT SERPL-MCNC: 0.81 MG/DL (ref 0.57–1)
DEPRECATED RDW RBC AUTO: 41.8 FL (ref 37–54)
ERYTHROCYTE [DISTWIDTH] IN BLOOD BY AUTOMATED COUNT: 13 % (ref 12.3–15.4)
GFR SERPL CREATININE-BSD FRML MDRD: 76 ML/MIN/1.73
GLUCOSE SERPL-MCNC: 80 MG/DL (ref 65–99)
HCG SERPL QL: NEGATIVE
HCT VFR BLD AUTO: 43.4 % (ref 34–46.6)
HGB BLD-MCNC: 14.5 G/DL (ref 12–15.9)
MCH RBC QN AUTO: 29.9 PG (ref 26.6–33)
MCHC RBC AUTO-ENTMCNC: 33.4 G/DL (ref 31.5–35.7)
MCV RBC AUTO: 89.5 FL (ref 79–97)
PLATELET # BLD AUTO: 272 10*3/MM3 (ref 140–450)
PMV BLD AUTO: 10.8 FL (ref 6–12)
POTASSIUM SERPL-SCNC: 4.2 MMOL/L (ref 3.5–5.2)
RBC # BLD AUTO: 4.85 10*6/MM3 (ref 3.77–5.28)
SODIUM SERPL-SCNC: 138 MMOL/L (ref 136–145)
WBC # BLD AUTO: 11.87 10*3/MM3 (ref 3.4–10.8)

## 2021-03-26 PROCEDURE — 80048 BASIC METABOLIC PNL TOTAL CA: CPT

## 2021-03-26 PROCEDURE — C9803 HOPD COVID-19 SPEC COLLECT: HCPCS

## 2021-03-26 PROCEDURE — 85027 COMPLETE CBC AUTOMATED: CPT

## 2021-03-26 PROCEDURE — U0004 COV-19 TEST NON-CDC HGH THRU: HCPCS

## 2021-03-26 PROCEDURE — 36415 COLL VENOUS BLD VENIPUNCTURE: CPT

## 2021-03-26 PROCEDURE — 84703 CHORIONIC GONADOTROPIN ASSAY: CPT

## 2021-03-27 LAB — SARS-COV-2 RNA RESP QL NAA+PROBE: NOT DETECTED

## 2021-03-29 ENCOUNTER — ANESTHESIA (OUTPATIENT)
Dept: PERIOP | Facility: HOSPITAL | Age: 46
End: 2021-03-29

## 2021-03-29 ENCOUNTER — ANESTHESIA EVENT (OUTPATIENT)
Dept: PERIOP | Facility: HOSPITAL | Age: 46
End: 2021-03-29

## 2021-03-29 ENCOUNTER — HOSPITAL ENCOUNTER (INPATIENT)
Facility: HOSPITAL | Age: 46
LOS: 1 days | Discharge: HOME | End: 2021-03-30
Attending: SURGERY | Admitting: SURGERY

## 2021-03-29 DIAGNOSIS — E03.9 HYPOTHYROIDISM, UNSPECIFIED TYPE: ICD-10-CM

## 2021-03-29 DIAGNOSIS — I10 ESSENTIAL HYPERTENSION: ICD-10-CM

## 2021-03-29 DIAGNOSIS — F41.9 ANXIETY: ICD-10-CM

## 2021-03-29 DIAGNOSIS — R53.82 CHRONIC FATIGUE: ICD-10-CM

## 2021-03-29 DIAGNOSIS — K44.9 HIATAL HERNIA: ICD-10-CM

## 2021-03-29 DIAGNOSIS — M72.2 PLANTAR FASCIITIS: ICD-10-CM

## 2021-03-29 DIAGNOSIS — E66.01 OBESITY, CLASS III, BMI 40-49.9 (MORBID OBESITY) (HCC): ICD-10-CM

## 2021-03-29 DIAGNOSIS — Z71.3 DIETARY COUNSELING: Primary | ICD-10-CM

## 2021-03-29 DIAGNOSIS — K21.9 GASTROESOPHAGEAL REFLUX DISEASE, UNSPECIFIED WHETHER ESOPHAGITIS PRESENT: ICD-10-CM

## 2021-03-29 DIAGNOSIS — D50.8 IRON DEFICIENCY ANEMIA SECONDARY TO INADEQUATE DIETARY IRON INTAKE: ICD-10-CM

## 2021-03-29 DIAGNOSIS — M25.50 MULTIPLE JOINT PAIN: ICD-10-CM

## 2021-03-29 PROCEDURE — 94799 UNLISTED PULMONARY SVC/PX: CPT

## 2021-03-29 PROCEDURE — 43775 LAP SLEEVE GASTRECTOMY: CPT | Performed by: NURSE PRACTITIONER

## 2021-03-29 PROCEDURE — 25010000002 DEXAMETHASONE PER 1 MG: Performed by: NURSE ANESTHETIST, CERTIFIED REGISTERED

## 2021-03-29 PROCEDURE — 88307 TISSUE EXAM BY PATHOLOGIST: CPT | Performed by: SURGERY

## 2021-03-29 PROCEDURE — 0DB64Z3 EXCISION OF STOMACH, PERCUTANEOUS ENDOSCOPIC APPROACH, VERTICAL: ICD-10-PCS | Performed by: SURGERY

## 2021-03-29 PROCEDURE — 25010000002 FENTANYL CITRATE (PF) 100 MCG/2ML SOLUTION: Performed by: NURSE ANESTHETIST, CERTIFIED REGISTERED

## 2021-03-29 PROCEDURE — 25010000002 ONDANSETRON PER 1 MG: Performed by: SURGERY

## 2021-03-29 PROCEDURE — 25010000002 MAGNESIUM SULFATE PER 500 MG OF MAGNESIUM: Performed by: NURSE ANESTHETIST, CERTIFIED REGISTERED

## 2021-03-29 PROCEDURE — 25010000002 HYDROMORPHONE PER 4 MG: Performed by: NURSE ANESTHETIST, CERTIFIED REGISTERED

## 2021-03-29 PROCEDURE — 25010000002 METOCLOPRAMIDE PER 10 MG: Performed by: SURGERY

## 2021-03-29 PROCEDURE — 25010000002 ONDANSETRON PER 1 MG: Performed by: NURSE ANESTHETIST, CERTIFIED REGISTERED

## 2021-03-29 PROCEDURE — 25010000002 ENOXAPARIN PER 10 MG: Performed by: SURGERY

## 2021-03-29 PROCEDURE — 25010000002 PROPOFOL 10 MG/ML EMULSION: Performed by: NURSE ANESTHETIST, CERTIFIED REGISTERED

## 2021-03-29 PROCEDURE — 43775 LAP SLEEVE GASTRECTOMY: CPT | Performed by: SURGERY

## 2021-03-29 PROCEDURE — 25010000002 ROPIVACAINE PER 1 MG: Performed by: ANESTHESIOLOGY

## 2021-03-29 DEVICE — SEALANT WND FIBRIN TISSEEL PREFIL/SYR/PRIMAFZ 4ML: Type: IMPLANTABLE DEVICE | Site: STOMACH | Status: FUNCTIONAL

## 2021-03-29 DEVICE — STPL/LN REINF PERISTRIP DRY VERITAS THN: Type: IMPLANTABLE DEVICE | Site: STOMACH | Status: FUNCTIONAL

## 2021-03-29 DEVICE — ENDOPATH ECHELON ENDOSCOPIC LINEAR CUTTER RELOADS, GOLD, 60MM
Type: IMPLANTABLE DEVICE | Site: STOMACH | Status: FUNCTIONAL
Brand: ECHELON ENDOPATH

## 2021-03-29 DEVICE — ENDOPATH ECHELON ENDOSCOPIC LINEAR CUTTER RELOADS, GREEN, 60MM
Type: IMPLANTABLE DEVICE | Site: STOMACH | Status: FUNCTIONAL
Brand: ECHELON ENDOPATH

## 2021-03-29 RX ORDER — MIDAZOLAM HYDROCHLORIDE 1 MG/ML
2 INJECTION INTRAMUSCULAR; INTRAVENOUS
Status: DISCONTINUED | OUTPATIENT
Start: 2021-03-29 | End: 2021-03-29 | Stop reason: HOSPADM

## 2021-03-29 RX ORDER — CETIRIZINE HYDROCHLORIDE 10 MG/1
10 TABLET ORAL AS NEEDED
COMMUNITY
End: 2022-05-23 | Stop reason: SDUPTHER

## 2021-03-29 RX ORDER — FLUMAZENIL 0.1 MG/ML
0.2 INJECTION INTRAVENOUS AS NEEDED
Status: DISCONTINUED | OUTPATIENT
Start: 2021-03-29 | End: 2021-03-29 | Stop reason: HOSPADM

## 2021-03-29 RX ORDER — ACETAMINOPHEN 500 MG
1000 TABLET ORAL EVERY 6 HOURS
Status: DISCONTINUED | OUTPATIENT
Start: 2021-03-29 | End: 2021-03-30 | Stop reason: HOSPADM

## 2021-03-29 RX ORDER — LISINOPRIL 10 MG/1
10 TABLET ORAL
Status: DISCONTINUED | OUTPATIENT
Start: 2021-03-29 | End: 2021-03-30 | Stop reason: HOSPADM

## 2021-03-29 RX ORDER — ONDANSETRON 2 MG/ML
4 INJECTION INTRAMUSCULAR; INTRAVENOUS EVERY 4 HOURS PRN
Status: DISCONTINUED | OUTPATIENT
Start: 2021-03-29 | End: 2021-03-30 | Stop reason: HOSPADM

## 2021-03-29 RX ORDER — NITROGLYCERIN 0.4 MG/1
0.4 TABLET SUBLINGUAL
Status: DISCONTINUED | OUTPATIENT
Start: 2021-03-29 | End: 2021-03-30 | Stop reason: HOSPADM

## 2021-03-29 RX ORDER — DIPHENHYDRAMINE HYDROCHLORIDE 50 MG/ML
25 INJECTION INTRAMUSCULAR; INTRAVENOUS EVERY 4 HOURS PRN
Status: DISCONTINUED | OUTPATIENT
Start: 2021-03-29 | End: 2021-03-30 | Stop reason: HOSPADM

## 2021-03-29 RX ORDER — FENTANYL CITRATE 50 UG/ML
50 INJECTION, SOLUTION INTRAMUSCULAR; INTRAVENOUS
Status: DISCONTINUED | OUTPATIENT
Start: 2021-03-29 | End: 2021-03-29 | Stop reason: HOSPADM

## 2021-03-29 RX ORDER — SODIUM CHLORIDE, SODIUM LACTATE, POTASSIUM CHLORIDE, CALCIUM CHLORIDE 600; 310; 30; 20 MG/100ML; MG/100ML; MG/100ML; MG/100ML
150 INJECTION, SOLUTION INTRAVENOUS CONTINUOUS
Status: DISCONTINUED | OUTPATIENT
Start: 2021-03-29 | End: 2021-03-30 | Stop reason: HOSPADM

## 2021-03-29 RX ORDER — PROMETHAZINE HYDROCHLORIDE 25 MG/1
25 SUPPOSITORY RECTAL ONCE AS NEEDED
Status: DISCONTINUED | OUTPATIENT
Start: 2021-03-29 | End: 2021-03-29 | Stop reason: HOSPADM

## 2021-03-29 RX ORDER — ONDANSETRON 2 MG/ML
4 INJECTION INTRAMUSCULAR; INTRAVENOUS ONCE AS NEEDED
Status: COMPLETED | OUTPATIENT
Start: 2021-03-29 | End: 2021-03-29

## 2021-03-29 RX ORDER — FAMOTIDINE 10 MG/ML
20 INJECTION, SOLUTION INTRAVENOUS ONCE
Status: COMPLETED | OUTPATIENT
Start: 2021-03-29 | End: 2021-03-29

## 2021-03-29 RX ORDER — PROPOFOL 10 MG/ML
VIAL (ML) INTRAVENOUS AS NEEDED
Status: DISCONTINUED | OUTPATIENT
Start: 2021-03-29 | End: 2021-03-29 | Stop reason: SURG

## 2021-03-29 RX ORDER — SODIUM CHLORIDE 0.9 % (FLUSH) 0.9 %
3-10 SYRINGE (ML) INJECTION AS NEEDED
Status: DISCONTINUED | OUTPATIENT
Start: 2021-03-29 | End: 2021-03-29 | Stop reason: HOSPADM

## 2021-03-29 RX ORDER — DEXAMETHASONE SODIUM PHOSPHATE 10 MG/ML
INJECTION INTRAMUSCULAR; INTRAVENOUS AS NEEDED
Status: DISCONTINUED | OUTPATIENT
Start: 2021-03-29 | End: 2021-03-29 | Stop reason: SURG

## 2021-03-29 RX ORDER — CEFAZOLIN SODIUM IN 0.9 % NACL 3 G/100 ML
3 INTRAVENOUS SOLUTION, PIGGYBACK (ML) INTRAVENOUS
Status: COMPLETED | OUTPATIENT
Start: 2021-03-29 | End: 2021-03-29

## 2021-03-29 RX ORDER — BUPIVACAINE HYDROCHLORIDE AND EPINEPHRINE 5; 5 MG/ML; UG/ML
INJECTION, SOLUTION PERINEURAL AS NEEDED
Status: DISCONTINUED | OUTPATIENT
Start: 2021-03-29 | End: 2021-03-29 | Stop reason: HOSPADM

## 2021-03-29 RX ORDER — PANTOPRAZOLE SODIUM 40 MG/10ML
40 INJECTION, POWDER, LYOPHILIZED, FOR SOLUTION INTRAVENOUS ONCE
Status: COMPLETED | OUTPATIENT
Start: 2021-03-29 | End: 2021-03-29

## 2021-03-29 RX ORDER — METOCLOPRAMIDE HYDROCHLORIDE 5 MG/ML
10 INJECTION INTRAMUSCULAR; INTRAVENOUS EVERY 6 HOURS
Status: DISCONTINUED | OUTPATIENT
Start: 2021-03-29 | End: 2021-03-30 | Stop reason: HOSPADM

## 2021-03-29 RX ORDER — ONDANSETRON 4 MG/1
4 TABLET, FILM COATED ORAL EVERY 4 HOURS PRN
Status: DISCONTINUED | OUTPATIENT
Start: 2021-03-29 | End: 2021-03-30 | Stop reason: HOSPADM

## 2021-03-29 RX ORDER — ROCURONIUM BROMIDE 10 MG/ML
INJECTION, SOLUTION INTRAVENOUS AS NEEDED
Status: DISCONTINUED | OUTPATIENT
Start: 2021-03-29 | End: 2021-03-29 | Stop reason: SURG

## 2021-03-29 RX ORDER — EPHEDRINE SULFATE 50 MG/ML
5 INJECTION, SOLUTION INTRAVENOUS ONCE AS NEEDED
Status: DISCONTINUED | OUTPATIENT
Start: 2021-03-29 | End: 2021-03-29 | Stop reason: HOSPADM

## 2021-03-29 RX ORDER — PROMETHAZINE HYDROCHLORIDE 25 MG/1
25 TABLET ORAL ONCE AS NEEDED
Status: DISCONTINUED | OUTPATIENT
Start: 2021-03-29 | End: 2021-03-29 | Stop reason: HOSPADM

## 2021-03-29 RX ORDER — LORAZEPAM 1 MG/1
1 TABLET ORAL EVERY 12 HOURS PRN
Status: DISCONTINUED | OUTPATIENT
Start: 2021-03-29 | End: 2021-03-30 | Stop reason: HOSPADM

## 2021-03-29 RX ORDER — DIPHENHYDRAMINE HYDROCHLORIDE 50 MG/ML
12.5 INJECTION INTRAMUSCULAR; INTRAVENOUS
Status: DISCONTINUED | OUTPATIENT
Start: 2021-03-29 | End: 2021-03-29 | Stop reason: HOSPADM

## 2021-03-29 RX ORDER — HYDROMORPHONE HYDROCHLORIDE 2 MG/1
2 TABLET ORAL EVERY 4 HOURS PRN
Status: DISCONTINUED | OUTPATIENT
Start: 2021-03-29 | End: 2021-03-30 | Stop reason: HOSPADM

## 2021-03-29 RX ORDER — GABAPENTIN 250 MG/5ML
300 SOLUTION ORAL ONCE
Status: COMPLETED | OUTPATIENT
Start: 2021-03-29 | End: 2021-03-29

## 2021-03-29 RX ORDER — LIDOCAINE HYDROCHLORIDE 10 MG/ML
0.5 INJECTION, SOLUTION EPIDURAL; INFILTRATION; INTRACAUDAL; PERINEURAL ONCE AS NEEDED
Status: DISCONTINUED | OUTPATIENT
Start: 2021-03-29 | End: 2021-03-29 | Stop reason: HOSPADM

## 2021-03-29 RX ORDER — GABAPENTIN 250 MG/5ML
300 SOLUTION ORAL EVERY 8 HOURS
Status: DISCONTINUED | OUTPATIENT
Start: 2021-03-29 | End: 2021-03-30 | Stop reason: HOSPADM

## 2021-03-29 RX ORDER — SODIUM CHLORIDE, SODIUM LACTATE, POTASSIUM CHLORIDE, CALCIUM CHLORIDE 600; 310; 30; 20 MG/100ML; MG/100ML; MG/100ML; MG/100ML
100 INJECTION, SOLUTION INTRAVENOUS CONTINUOUS
Status: DISCONTINUED | OUTPATIENT
Start: 2021-03-29 | End: 2021-03-29 | Stop reason: HOSPADM

## 2021-03-29 RX ORDER — SODIUM CHLORIDE 9 MG/ML
INJECTION, SOLUTION INTRAVENOUS AS NEEDED
Status: DISCONTINUED | OUTPATIENT
Start: 2021-03-29 | End: 2021-03-29 | Stop reason: HOSPADM

## 2021-03-29 RX ORDER — CHLORHEXIDINE GLUCONATE 0.12 MG/ML
15 RINSE ORAL SEE ADMIN INSTRUCTIONS
Status: COMPLETED | OUTPATIENT
Start: 2021-03-29 | End: 2021-03-29

## 2021-03-29 RX ORDER — MAGNESIUM SULFATE HEPTAHYDRATE 500 MG/ML
INJECTION, SOLUTION INTRAMUSCULAR; INTRAVENOUS AS NEEDED
Status: DISCONTINUED | OUTPATIENT
Start: 2021-03-29 | End: 2021-03-29 | Stop reason: SURG

## 2021-03-29 RX ORDER — CYANOCOBALAMIN 1000 UG/ML
1000 INJECTION, SOLUTION INTRAMUSCULAR; SUBCUTANEOUS ONCE
Status: COMPLETED | OUTPATIENT
Start: 2021-03-30 | End: 2021-03-30

## 2021-03-29 RX ORDER — MAGNESIUM HYDROXIDE 1200 MG/15ML
LIQUID ORAL AS NEEDED
Status: DISCONTINUED | OUTPATIENT
Start: 2021-03-29 | End: 2021-03-29 | Stop reason: HOSPADM

## 2021-03-29 RX ORDER — FAMOTIDINE 10 MG/ML
20 INJECTION, SOLUTION INTRAVENOUS EVERY 12 HOURS SCHEDULED
Status: DISCONTINUED | OUTPATIENT
Start: 2021-03-29 | End: 2021-03-30 | Stop reason: HOSPADM

## 2021-03-29 RX ORDER — KETAMINE HYDROCHLORIDE 10 MG/ML
INJECTION INTRAMUSCULAR; INTRAVENOUS AS NEEDED
Status: DISCONTINUED | OUTPATIENT
Start: 2021-03-29 | End: 2021-03-29 | Stop reason: SURG

## 2021-03-29 RX ORDER — SODIUM CHLORIDE, SODIUM LACTATE, POTASSIUM CHLORIDE, CALCIUM CHLORIDE 600; 310; 30; 20 MG/100ML; MG/100ML; MG/100ML; MG/100ML
9 INJECTION, SOLUTION INTRAVENOUS CONTINUOUS
Status: DISCONTINUED | OUTPATIENT
Start: 2021-03-29 | End: 2021-03-29 | Stop reason: HOSPADM

## 2021-03-29 RX ORDER — HYDROMORPHONE HYDROCHLORIDE 1 MG/ML
0.5 INJECTION, SOLUTION INTRAMUSCULAR; INTRAVENOUS; SUBCUTANEOUS
Status: DISCONTINUED | OUTPATIENT
Start: 2021-03-29 | End: 2021-03-29 | Stop reason: HOSPADM

## 2021-03-29 RX ORDER — LABETALOL HYDROCHLORIDE 5 MG/ML
5 INJECTION, SOLUTION INTRAVENOUS
Status: DISCONTINUED | OUTPATIENT
Start: 2021-03-29 | End: 2021-03-29 | Stop reason: HOSPADM

## 2021-03-29 RX ORDER — OXYCODONE AND ACETAMINOPHEN 7.5; 325 MG/1; MG/1
1 TABLET ORAL ONCE AS NEEDED
Status: DISCONTINUED | OUTPATIENT
Start: 2021-03-29 | End: 2021-03-29 | Stop reason: HOSPADM

## 2021-03-29 RX ORDER — LABETALOL HYDROCHLORIDE 5 MG/ML
10 INJECTION, SOLUTION INTRAVENOUS
Status: DISCONTINUED | OUTPATIENT
Start: 2021-03-29 | End: 2021-03-30 | Stop reason: HOSPADM

## 2021-03-29 RX ORDER — HYDROCODONE BITARTRATE AND ACETAMINOPHEN 7.5; 325 MG/1; MG/1
1 TABLET ORAL ONCE AS NEEDED
Status: DISCONTINUED | OUTPATIENT
Start: 2021-03-29 | End: 2021-03-29 | Stop reason: HOSPADM

## 2021-03-29 RX ORDER — MIRTAZAPINE 15 MG/1
15 TABLET, ORALLY DISINTEGRATING ORAL NIGHTLY
Status: DISCONTINUED | OUTPATIENT
Start: 2021-03-29 | End: 2021-03-30 | Stop reason: HOSPADM

## 2021-03-29 RX ORDER — ACETAMINOPHEN 160 MG/5ML
975 SOLUTION ORAL ONCE
Status: COMPLETED | OUTPATIENT
Start: 2021-03-29 | End: 2021-03-29

## 2021-03-29 RX ORDER — ROPIVACAINE HYDROCHLORIDE 5 MG/ML
INJECTION, SOLUTION EPIDURAL; INFILTRATION; PERINEURAL
Status: COMPLETED | OUTPATIENT
Start: 2021-03-29 | End: 2021-03-29

## 2021-03-29 RX ORDER — METOCLOPRAMIDE HYDROCHLORIDE 5 MG/ML
10 INJECTION INTRAMUSCULAR; INTRAVENOUS ONCE
Status: COMPLETED | OUTPATIENT
Start: 2021-03-29 | End: 2021-03-29

## 2021-03-29 RX ORDER — PROMETHAZINE HYDROCHLORIDE 25 MG/1
25 SUPPOSITORY RECTAL ONCE
Status: COMPLETED | OUTPATIENT
Start: 2021-03-29 | End: 2021-03-29

## 2021-03-29 RX ORDER — PROMETHAZINE HYDROCHLORIDE 12.5 MG/1
12.5 TABLET ORAL EVERY 4 HOURS PRN
Status: DISCONTINUED | OUTPATIENT
Start: 2021-03-29 | End: 2021-03-30 | Stop reason: HOSPADM

## 2021-03-29 RX ORDER — LORAZEPAM 2 MG/ML
1 INJECTION INTRAMUSCULAR EVERY 12 HOURS PRN
Status: DISCONTINUED | OUTPATIENT
Start: 2021-03-29 | End: 2021-03-30 | Stop reason: HOSPADM

## 2021-03-29 RX ORDER — ALBUTEROL SULFATE 2.5 MG/3ML
2.5 SOLUTION RESPIRATORY (INHALATION) EVERY 6 HOURS PRN
Status: DISCONTINUED | OUTPATIENT
Start: 2021-03-29 | End: 2021-03-30 | Stop reason: HOSPADM

## 2021-03-29 RX ORDER — DIPHENHYDRAMINE HCL 25 MG
25 CAPSULE ORAL
Status: DISCONTINUED | OUTPATIENT
Start: 2021-03-29 | End: 2021-03-29 | Stop reason: HOSPADM

## 2021-03-29 RX ORDER — SODIUM CHLORIDE 0.9 % (FLUSH) 0.9 %
3 SYRINGE (ML) INJECTION EVERY 12 HOURS SCHEDULED
Status: DISCONTINUED | OUTPATIENT
Start: 2021-03-29 | End: 2021-03-30 | Stop reason: HOSPADM

## 2021-03-29 RX ORDER — PROCHLORPERAZINE EDISYLATE 5 MG/ML
10 INJECTION INTRAMUSCULAR; INTRAVENOUS EVERY 6 HOURS PRN
Status: DISCONTINUED | OUTPATIENT
Start: 2021-03-29 | End: 2021-03-30 | Stop reason: HOSPADM

## 2021-03-29 RX ORDER — SCOLOPAMINE TRANSDERMAL SYSTEM 1 MG/1
1 PATCH, EXTENDED RELEASE TRANSDERMAL CONTINUOUS
Status: DISCONTINUED | OUTPATIENT
Start: 2021-03-29 | End: 2021-03-30 | Stop reason: HOSPADM

## 2021-03-29 RX ORDER — PROMETHAZINE HYDROCHLORIDE 25 MG/1
12.5 TABLET ORAL ONCE
Status: COMPLETED | OUTPATIENT
Start: 2021-03-29 | End: 2021-03-29

## 2021-03-29 RX ORDER — PROMETHAZINE HYDROCHLORIDE 12.5 MG/1
12.5 SUPPOSITORY RECTAL EVERY 4 HOURS PRN
Status: DISCONTINUED | OUTPATIENT
Start: 2021-03-29 | End: 2021-03-30 | Stop reason: HOSPADM

## 2021-03-29 RX ORDER — MIDAZOLAM HYDROCHLORIDE 1 MG/ML
1 INJECTION INTRAMUSCULAR; INTRAVENOUS
Status: DISCONTINUED | OUTPATIENT
Start: 2021-03-29 | End: 2021-03-29 | Stop reason: HOSPADM

## 2021-03-29 RX ORDER — NALOXONE HCL 0.4 MG/ML
0.2 VIAL (ML) INJECTION AS NEEDED
Status: DISCONTINUED | OUTPATIENT
Start: 2021-03-29 | End: 2021-03-29 | Stop reason: HOSPADM

## 2021-03-29 RX ORDER — LEVOTHYROXINE SODIUM 0.05 MG/1
50 TABLET ORAL DAILY
Status: DISCONTINUED | OUTPATIENT
Start: 2021-03-30 | End: 2021-03-30 | Stop reason: HOSPADM

## 2021-03-29 RX ORDER — HALOPERIDOL 5 MG/ML
0.5 INJECTION INTRAMUSCULAR EVERY 4 HOURS PRN
Status: DISCONTINUED | OUTPATIENT
Start: 2021-03-29 | End: 2021-03-30 | Stop reason: HOSPADM

## 2021-03-29 RX ORDER — LIDOCAINE HYDROCHLORIDE 20 MG/ML
INJECTION, SOLUTION INFILTRATION; PERINEURAL AS NEEDED
Status: DISCONTINUED | OUTPATIENT
Start: 2021-03-29 | End: 2021-03-29 | Stop reason: SURG

## 2021-03-29 RX ORDER — ONDANSETRON 4 MG/1
4 TABLET, ORALLY DISINTEGRATING ORAL EVERY 4 HOURS PRN
Status: DISCONTINUED | OUTPATIENT
Start: 2021-03-29 | End: 2021-03-30 | Stop reason: HOSPADM

## 2021-03-29 RX ORDER — SODIUM CHLORIDE 0.9 % (FLUSH) 0.9 %
3 SYRINGE (ML) INJECTION EVERY 12 HOURS SCHEDULED
Status: DISCONTINUED | OUTPATIENT
Start: 2021-03-29 | End: 2021-03-29 | Stop reason: HOSPADM

## 2021-03-29 RX ADMIN — FAMOTIDINE 20 MG: 10 INJECTION, SOLUTION INTRAVENOUS at 08:20

## 2021-03-29 RX ADMIN — SODIUM CHLORIDE, PRESERVATIVE FREE 3 ML: 5 INJECTION INTRAVENOUS at 22:01

## 2021-03-29 RX ADMIN — PANTOPRAZOLE SODIUM 40 MG: 40 INJECTION, POWDER, FOR SOLUTION INTRAVENOUS at 07:53

## 2021-03-29 RX ADMIN — PROPOFOL 150 MCG/KG/MIN: 10 INJECTION, EMULSION INTRAVENOUS at 09:58

## 2021-03-29 RX ADMIN — ROPIVACAINE HYDROCHLORIDE 30 ML: 5 INJECTION, SOLUTION EPIDURAL; INFILTRATION; PERINEURAL at 10:03

## 2021-03-29 RX ADMIN — SODIUM CHLORIDE, POTASSIUM CHLORIDE, SODIUM LACTATE AND CALCIUM CHLORIDE 150 ML/HR: 600; 310; 30; 20 INJECTION, SOLUTION INTRAVENOUS at 23:14

## 2021-03-29 RX ADMIN — DEXAMETHASONE SODIUM PHOSPHATE 8 MG: 10 INJECTION INTRAMUSCULAR; INTRAVENOUS at 09:58

## 2021-03-29 RX ADMIN — SODIUM CHLORIDE, POTASSIUM CHLORIDE, SODIUM LACTATE AND CALCIUM CHLORIDE 500 ML: 600; 310; 30; 20 INJECTION, SOLUTION INTRAVENOUS at 07:45

## 2021-03-29 RX ADMIN — CHLORHEXIDINE GLUCONATE 15 ML: 1.2 RINSE ORAL at 08:20

## 2021-03-29 RX ADMIN — SODIUM CHLORIDE, POTASSIUM CHLORIDE, SODIUM LACTATE AND CALCIUM CHLORIDE 150 ML/HR: 600; 310; 30; 20 INJECTION, SOLUTION INTRAVENOUS at 16:44

## 2021-03-29 RX ADMIN — LIDOCAINE HYDROCHLORIDE 100 MG: 20 INJECTION, SOLUTION INFILTRATION; PERINEURAL at 09:58

## 2021-03-29 RX ADMIN — LISINOPRIL 10 MG: 10 TABLET ORAL at 15:17

## 2021-03-29 RX ADMIN — SODIUM CHLORIDE, POTASSIUM CHLORIDE, SODIUM LACTATE AND CALCIUM CHLORIDE: 600; 310; 30; 20 INJECTION, SOLUTION INTRAVENOUS at 10:10

## 2021-03-29 RX ADMIN — PROMETHAZINE HYDROCHLORIDE 25 MG: 25 SUPPOSITORY RECTAL at 09:30

## 2021-03-29 RX ADMIN — METOCLOPRAMIDE HYDROCHLORIDE 10 MG: 5 INJECTION INTRAMUSCULAR; INTRAVENOUS at 14:55

## 2021-03-29 RX ADMIN — SODIUM CHLORIDE, POTASSIUM CHLORIDE, SODIUM LACTATE AND CALCIUM CHLORIDE: 600; 310; 30; 20 INJECTION, SOLUTION INTRAVENOUS at 09:44

## 2021-03-29 RX ADMIN — MAGNESIUM SULFATE HEPTAHYDRATE 1 G: 500 INJECTION, SOLUTION INTRAMUSCULAR; INTRAVENOUS at 10:18

## 2021-03-29 RX ADMIN — HYDROMORPHONE HYDROCHLORIDE 0.5 MG: 1 INJECTION, SOLUTION INTRAMUSCULAR; INTRAVENOUS; SUBCUTANEOUS at 11:15

## 2021-03-29 RX ADMIN — ACETAMINOPHEN 1000 MG: 500 TABLET, FILM COATED ORAL at 22:00

## 2021-03-29 RX ADMIN — KETAMINE HYDROCHLORIDE 10 MCG: 10 INJECTION INTRAMUSCULAR; INTRAVENOUS at 10:03

## 2021-03-29 RX ADMIN — ACETAMINOPHEN ORAL SOLUTION 975 MG: 325 SOLUTION ORAL at 07:38

## 2021-03-29 RX ADMIN — CEFAZOLIN 3 G: 1 INJECTION, POWDER, FOR SOLUTION INTRAMUSCULAR; INTRAVENOUS; PARENTERAL at 10:03

## 2021-03-29 RX ADMIN — SODIUM CHLORIDE, PRESERVATIVE FREE 3 ML: 5 INJECTION INTRAVENOUS at 14:55

## 2021-03-29 RX ADMIN — ONDANSETRON 4 MG: 2 INJECTION INTRAMUSCULAR; INTRAVENOUS at 10:31

## 2021-03-29 RX ADMIN — METOCLOPRAMIDE HYDROCHLORIDE 10 MG: 5 INJECTION INTRAMUSCULAR; INTRAVENOUS at 22:00

## 2021-03-29 RX ADMIN — ACETAMINOPHEN 1000 MG: 500 TABLET, FILM COATED ORAL at 14:54

## 2021-03-29 RX ADMIN — METOCLOPRAMIDE HYDROCHLORIDE 10 MG: 5 INJECTION INTRAMUSCULAR; INTRAVENOUS at 07:53

## 2021-03-29 RX ADMIN — LABETALOL HYDROCHLORIDE 5 MG: 5 INJECTION, SOLUTION INTRAVENOUS at 11:00

## 2021-03-29 RX ADMIN — MAGNESIUM SULFATE HEPTAHYDRATE 1 G: 500 INJECTION, SOLUTION INTRAMUSCULAR; INTRAVENOUS at 10:10

## 2021-03-29 RX ADMIN — GABAPENTIN 300 MG: 250 SOLUTION ORAL at 22:02

## 2021-03-29 RX ADMIN — FENTANYL CITRATE 50 MCG: 50 INJECTION INTRAMUSCULAR; INTRAVENOUS at 09:58

## 2021-03-29 RX ADMIN — ONDANSETRON 4 MG: 2 INJECTION INTRAMUSCULAR; INTRAVENOUS at 15:17

## 2021-03-29 RX ADMIN — ENOXAPARIN SODIUM 40 MG: 40 INJECTION SUBCUTANEOUS at 11:17

## 2021-03-29 RX ADMIN — FAMOTIDINE 20 MG: 10 INJECTION INTRAVENOUS at 22:01

## 2021-03-29 RX ADMIN — ROCURONIUM BROMIDE 50 MG: 50 INJECTION INTRAVENOUS at 09:58

## 2021-03-29 RX ADMIN — FENTANYL CITRATE 50 MCG: 50 INJECTION INTRAMUSCULAR; INTRAVENOUS at 10:08

## 2021-03-29 RX ADMIN — FAMOTIDINE 20 MG: 10 INJECTION INTRAVENOUS at 14:55

## 2021-03-29 RX ADMIN — MIRTAZAPINE 15 MG: 15 TABLET, ORALLY DISINTEGRATING ORAL at 22:01

## 2021-03-29 RX ADMIN — PROPOFOL 200 MG: 10 INJECTION, EMULSION INTRAVENOUS at 09:58

## 2021-03-29 RX ADMIN — SUGAMMADEX 200 MG: 100 INJECTION, SOLUTION INTRAVENOUS at 10:32

## 2021-03-29 RX ADMIN — GABAPENTIN 300 MG: 250 SOLUTION ORAL at 07:38

## 2021-03-30 VITALS
HEIGHT: 66 IN | TEMPERATURE: 98.2 F | RESPIRATION RATE: 20 BRPM | OXYGEN SATURATION: 95 % | BODY MASS INDEX: 45.32 KG/M2 | DIASTOLIC BLOOD PRESSURE: 89 MMHG | WEIGHT: 282 LBS | SYSTOLIC BLOOD PRESSURE: 156 MMHG | HEART RATE: 67 BPM

## 2021-03-30 LAB
ALBUMIN SERPL-MCNC: 4 G/DL (ref 3.5–5.2)
ALBUMIN/GLOB SERPL: 1.6 G/DL
ALP SERPL-CCNC: 80 U/L (ref 39–117)
ALT SERPL W P-5'-P-CCNC: 22 U/L (ref 1–33)
ANION GAP SERPL CALCULATED.3IONS-SCNC: 12.4 MMOL/L (ref 5–15)
AST SERPL-CCNC: 18 U/L (ref 1–32)
BASOPHILS # BLD AUTO: 0.07 10*3/MM3 (ref 0–0.2)
BASOPHILS NFR BLD AUTO: 0.3 % (ref 0–1.5)
BILIRUB SERPL-MCNC: 0.4 MG/DL (ref 0–1.2)
BILIRUB UR QL STRIP: NEGATIVE
BUN SERPL-MCNC: 7 MG/DL (ref 6–20)
BUN/CREAT SERPL: 12.1 (ref 7–25)
CALCIUM SPEC-SCNC: 8.5 MG/DL (ref 8.6–10.5)
CHLORIDE SERPL-SCNC: 104 MMOL/L (ref 98–107)
CLARITY UR: CLEAR
CO2 SERPL-SCNC: 20.6 MMOL/L (ref 22–29)
COLOR UR: YELLOW
CREAT SERPL-MCNC: 0.58 MG/DL (ref 0.57–1)
CYTO UR: NORMAL
DEPRECATED RDW RBC AUTO: 42.2 FL (ref 37–54)
DEPRECATED RDW RBC AUTO: 44.3 FL (ref 37–54)
EOSINOPHIL # BLD AUTO: 0.01 10*3/MM3 (ref 0–0.4)
EOSINOPHIL NFR BLD AUTO: 0 % (ref 0.3–6.2)
ERYTHROCYTE [DISTWIDTH] IN BLOOD BY AUTOMATED COUNT: 13 % (ref 12.3–15.4)
ERYTHROCYTE [DISTWIDTH] IN BLOOD BY AUTOMATED COUNT: 13.4 % (ref 12.3–15.4)
GFR SERPL CREATININE-BSD FRML MDRD: 112 ML/MIN/1.73
GLOBULIN UR ELPH-MCNC: 2.5 GM/DL
GLUCOSE SERPL-MCNC: 111 MG/DL (ref 65–99)
GLUCOSE UR STRIP-MCNC: NEGATIVE MG/DL
HCT VFR BLD AUTO: 44 % (ref 34–46.6)
HCT VFR BLD AUTO: 44.1 % (ref 34–46.6)
HGB BLD-MCNC: 14.2 G/DL (ref 12–15.9)
HGB BLD-MCNC: 14.6 G/DL (ref 12–15.9)
HGB UR QL STRIP.AUTO: NEGATIVE
IMM GRANULOCYTES # BLD AUTO: 0.16 10*3/MM3 (ref 0–0.05)
IMM GRANULOCYTES NFR BLD AUTO: 0.7 % (ref 0–0.5)
KETONES UR QL STRIP: ABNORMAL
LAB AP CASE REPORT: NORMAL
LEUKOCYTE ESTERASE UR QL STRIP.AUTO: NEGATIVE
LYMPHOCYTES # BLD AUTO: 1.4 10*3/MM3 (ref 0.7–3.1)
LYMPHOCYTES # BLD MANUAL: 0.47 10*3/MM3 (ref 0.7–3.1)
LYMPHOCYTES NFR BLD AUTO: 6.4 % (ref 19.6–45.3)
LYMPHOCYTES NFR BLD MANUAL: 2 % (ref 19.6–45.3)
LYMPHOCYTES NFR BLD MANUAL: 5 % (ref 5–12)
MAGNESIUM SERPL-MCNC: 2 MG/DL (ref 1.6–2.6)
MCH RBC QN AUTO: 29.3 PG (ref 26.6–33)
MCH RBC QN AUTO: 29.9 PG (ref 26.6–33)
MCHC RBC AUTO-ENTMCNC: 32.2 G/DL (ref 31.5–35.7)
MCHC RBC AUTO-ENTMCNC: 33.2 G/DL (ref 31.5–35.7)
MCV RBC AUTO: 90 FL (ref 79–97)
MCV RBC AUTO: 90.9 FL (ref 79–97)
MONOCYTES # BLD AUTO: 1.19 10*3/MM3 (ref 0.1–0.9)
MONOCYTES # BLD AUTO: 1.97 10*3/MM3 (ref 0.1–0.9)
MONOCYTES NFR BLD AUTO: 9 % (ref 5–12)
NEUTROPHILS # BLD AUTO: 22.06 10*3/MM3 (ref 1.7–7)
NEUTROPHILS NFR BLD AUTO: 18.21 10*3/MM3 (ref 1.7–7)
NEUTROPHILS NFR BLD AUTO: 83.6 % (ref 42.7–76)
NEUTROPHILS NFR BLD MANUAL: 93 % (ref 42.7–76)
NITRITE UR QL STRIP: NEGATIVE
NRBC BLD AUTO-RTO: 0 /100 WBC (ref 0–0.2)
NRBC BLD AUTO-RTO: 0 /100 WBC (ref 0–0.2)
PATH REPORT.FINAL DX SPEC: NORMAL
PATH REPORT.GROSS SPEC: NORMAL
PH UR STRIP.AUTO: 7 [PH] (ref 5–8)
PHOSPHATE SERPL-MCNC: 2.7 MG/DL (ref 2.5–4.5)
PLAT MORPH BLD: NORMAL
PLATELET # BLD AUTO: 207 10*3/MM3 (ref 140–450)
PLATELET # BLD AUTO: 228 10*3/MM3 (ref 140–450)
PMV BLD AUTO: 11.4 FL (ref 6–12)
PMV BLD AUTO: 11.8 FL (ref 6–12)
POTASSIUM SERPL-SCNC: 5.4 MMOL/L (ref 3.5–5.2)
PROT SERPL-MCNC: 6.5 G/DL (ref 6–8.5)
PROT UR QL STRIP: NEGATIVE
RBC # BLD AUTO: 4.85 10*6/MM3 (ref 3.77–5.28)
RBC # BLD AUTO: 4.89 10*6/MM3 (ref 3.77–5.28)
RBC MORPH BLD: NORMAL
SODIUM SERPL-SCNC: 137 MMOL/L (ref 136–145)
SP GR UR STRIP: 1.01 (ref 1–1.03)
UROBILINOGEN UR QL STRIP: ABNORMAL
WBC # BLD AUTO: 21.82 10*3/MM3 (ref 3.4–10.8)
WBC # BLD AUTO: 23.72 10*3/MM3 (ref 3.4–10.8)
WBC MORPH BLD: NORMAL

## 2021-03-30 PROCEDURE — 85007 BL SMEAR W/DIFF WBC COUNT: CPT | Performed by: SURGERY

## 2021-03-30 PROCEDURE — 80053 COMPREHEN METABOLIC PANEL: CPT | Performed by: SURGERY

## 2021-03-30 PROCEDURE — 25010000002 ENOXAPARIN PER 10 MG: Performed by: SURGERY

## 2021-03-30 PROCEDURE — 94799 UNLISTED PULMONARY SVC/PX: CPT

## 2021-03-30 PROCEDURE — 85025 COMPLETE CBC W/AUTO DIFF WBC: CPT | Performed by: SURGERY

## 2021-03-30 PROCEDURE — 83735 ASSAY OF MAGNESIUM: CPT | Performed by: SURGERY

## 2021-03-30 PROCEDURE — 84100 ASSAY OF PHOSPHORUS: CPT | Performed by: SURGERY

## 2021-03-30 PROCEDURE — 25010000002 CYANOCOBALAMIN PER 1000 MCG: Performed by: SURGERY

## 2021-03-30 PROCEDURE — 25010000002 METOCLOPRAMIDE PER 10 MG: Performed by: SURGERY

## 2021-03-30 PROCEDURE — 81003 URINALYSIS AUTO W/O SCOPE: CPT | Performed by: SURGERY

## 2021-03-30 RX ORDER — HYDROMORPHONE HYDROCHLORIDE 2 MG/1
2 TABLET ORAL EVERY 4 HOURS PRN
Qty: 18 TABLET | Refills: 0 | Status: SHIPPED | OUTPATIENT
Start: 2021-03-30 | End: 2021-04-01

## 2021-03-30 RX ORDER — ONDANSETRON 4 MG/1
4 TABLET, FILM COATED ORAL EVERY 6 HOURS PRN
Qty: 10 TABLET | Refills: 0 | Status: SHIPPED | OUTPATIENT
Start: 2021-03-30 | End: 2021-05-04

## 2021-03-30 RX ADMIN — LEVOTHYROXINE SODIUM 50 MCG: 0.05 TABLET ORAL at 06:47

## 2021-03-30 RX ADMIN — ENOXAPARIN SODIUM 40 MG: 40 INJECTION SUBCUTANEOUS at 08:07

## 2021-03-30 RX ADMIN — METOCLOPRAMIDE HYDROCHLORIDE 10 MG: 5 INJECTION INTRAMUSCULAR; INTRAVENOUS at 08:07

## 2021-03-30 RX ADMIN — SODIUM CHLORIDE, POTASSIUM CHLORIDE, SODIUM LACTATE AND CALCIUM CHLORIDE 150 ML/HR: 600; 310; 30; 20 INJECTION, SOLUTION INTRAVENOUS at 13:02

## 2021-03-30 RX ADMIN — LISINOPRIL 10 MG: 10 TABLET ORAL at 12:20

## 2021-03-30 RX ADMIN — FAMOTIDINE 20 MG: 10 INJECTION INTRAVENOUS at 08:07

## 2021-03-30 RX ADMIN — GABAPENTIN 300 MG: 250 SOLUTION ORAL at 06:47

## 2021-03-30 RX ADMIN — CYANOCOBALAMIN 1000 MCG: 1000 INJECTION, SOLUTION INTRAMUSCULAR at 08:06

## 2021-03-30 RX ADMIN — ACETAMINOPHEN 1000 MG: 500 TABLET, FILM COATED ORAL at 16:02

## 2021-03-30 RX ADMIN — ACETAMINOPHEN 1000 MG: 500 TABLET, FILM COATED ORAL at 04:22

## 2021-03-30 RX ADMIN — METOCLOPRAMIDE HYDROCHLORIDE 10 MG: 5 INJECTION INTRAMUSCULAR; INTRAVENOUS at 03:34

## 2021-03-30 RX ADMIN — SODIUM CHLORIDE, POTASSIUM CHLORIDE, SODIUM LACTATE AND CALCIUM CHLORIDE 150 ML/HR: 600; 310; 30; 20 INJECTION, SOLUTION INTRAVENOUS at 05:31

## 2021-03-30 RX ADMIN — ACETAMINOPHEN 1000 MG: 500 TABLET, FILM COATED ORAL at 08:06

## 2021-04-01 ENCOUNTER — OFFICE VISIT (OUTPATIENT)
Dept: BARIATRICS/WEIGHT MGMT | Facility: CLINIC | Age: 46
End: 2021-04-01

## 2021-04-01 VITALS
DIASTOLIC BLOOD PRESSURE: 90 MMHG | TEMPERATURE: 98.2 F | SYSTOLIC BLOOD PRESSURE: 164 MMHG | HEIGHT: 66 IN | WEIGHT: 277 LBS | HEART RATE: 91 BPM | BODY MASS INDEX: 44.52 KG/M2 | RESPIRATION RATE: 18 BRPM

## 2021-04-01 DIAGNOSIS — E66.01 OBESITY, CLASS III, BMI 40-49.9 (MORBID OBESITY) (HCC): Primary | ICD-10-CM

## 2021-04-01 DIAGNOSIS — Z71.3 DIETARY COUNSELING: ICD-10-CM

## 2021-04-01 DIAGNOSIS — Z98.84 S/P LAPAROSCOPIC SLEEVE GASTRECTOMY: ICD-10-CM

## 2021-04-01 PROCEDURE — 99024 POSTOP FOLLOW-UP VISIT: CPT | Performed by: NURSE PRACTITIONER

## 2021-04-06 ENCOUNTER — TELEMEDICINE CONVERTED (OUTPATIENT)
Dept: FAMILY MEDICINE CLINIC | Facility: CLINIC | Age: 46
End: 2021-04-06
Attending: PHYSICIAN ASSISTANT

## 2021-04-21 ENCOUNTER — HOSPITAL ENCOUNTER (OUTPATIENT)
Dept: LAB | Facility: HOSPITAL | Age: 46
Discharge: HOME | End: 2021-04-21
Attending: PHYSICIAN ASSISTANT

## 2021-04-21 LAB
BASOPHILS # BLD AUTO: 0.06 10*3/UL (ref 0–0.2)
BASOPHILS NFR BLD AUTO: 0.5 % (ref 0–3)
CONV ABS IMM GRAN: 0.05 10*3/UL (ref 0–0.2)
CONV IMMATURE GRAN: 0.4 % (ref 0–1.8)
DEPRECATED RDW RBC AUTO: 45.2 FL (ref 36.4–46.3)
EOSINOPHIL # BLD AUTO: 0.17 10*3/UL (ref 0–0.7)
EOSINOPHIL # BLD AUTO: 1.5 % (ref 0–7)
ERYTHROCYTE [DISTWIDTH] IN BLOOD BY AUTOMATED COUNT: 13.7 % (ref 11.7–14.4)
HCT VFR BLD AUTO: 41.4 % (ref 37–47)
HGB BLD-MCNC: 13.6 G/DL (ref 12–16)
LYMPHOCYTES # BLD AUTO: 2.69 10*3/UL (ref 1–5)
LYMPHOCYTES NFR BLD AUTO: 23.4 % (ref 20–45)
MCH RBC QN AUTO: 29.6 PG (ref 27–31)
MCHC RBC AUTO-ENTMCNC: 32.9 G/DL (ref 33–37)
MCV RBC AUTO: 90.2 FL (ref 81–99)
MONOCYTES # BLD AUTO: 0.86 10*3/UL (ref 0.2–1.2)
MONOCYTES NFR BLD AUTO: 7.5 % (ref 3–10)
NEUTROPHILS # BLD AUTO: 7.69 10*3/UL (ref 2–8)
NEUTROPHILS NFR BLD AUTO: 66.7 % (ref 30–85)
NRBC CBCN: 0 % (ref 0–0.7)
PLATELET # BLD AUTO: 273 10*3/UL (ref 130–400)
PMV BLD AUTO: 12.1 FL (ref 9.4–12.3)
RBC # BLD AUTO: 4.59 10*6/UL (ref 4.2–5.4)
WBC # BLD AUTO: 11.52 10*3/UL (ref 4.8–10.8)

## 2021-05-04 ENCOUNTER — LAB (OUTPATIENT)
Dept: LAB | Facility: HOSPITAL | Age: 46
End: 2021-05-04

## 2021-05-04 ENCOUNTER — HOSPITAL ENCOUNTER (OUTPATIENT)
Dept: CARDIOLOGY | Facility: HOSPITAL | Age: 46
Discharge: HOME | End: 2021-05-04

## 2021-05-04 ENCOUNTER — TRANSCRIBE ORDERS (OUTPATIENT)
Dept: CARDIOLOGY | Facility: HOSPITAL | Age: 46
End: 2021-05-04

## 2021-05-04 ENCOUNTER — OFFICE VISIT (OUTPATIENT)
Dept: BARIATRICS/WEIGHT MGMT | Facility: CLINIC | Age: 46
End: 2021-05-04

## 2021-05-04 VITALS
DIASTOLIC BLOOD PRESSURE: 95 MMHG | HEIGHT: 66 IN | TEMPERATURE: 98 F | WEIGHT: 265 LBS | SYSTOLIC BLOOD PRESSURE: 147 MMHG | HEART RATE: 73 BPM | BODY MASS INDEX: 42.59 KG/M2 | RESPIRATION RATE: 18 BRPM

## 2021-05-04 DIAGNOSIS — Z01.811 PRE-OP CHEST EXAM: Primary | ICD-10-CM

## 2021-05-04 DIAGNOSIS — I10 ESSENTIAL HYPERTENSION: ICD-10-CM

## 2021-05-04 DIAGNOSIS — R53.82 CHRONIC FATIGUE: ICD-10-CM

## 2021-05-04 DIAGNOSIS — Z71.3 DIETARY COUNSELING: ICD-10-CM

## 2021-05-04 DIAGNOSIS — Z98.84 S/P LAPAROSCOPIC SLEEVE GASTRECTOMY: ICD-10-CM

## 2021-05-04 DIAGNOSIS — E66.01 OBESITY, CLASS III, BMI 40-49.9 (MORBID OBESITY) (HCC): ICD-10-CM

## 2021-05-04 DIAGNOSIS — D50.8 IRON DEFICIENCY ANEMIA SECONDARY TO INADEQUATE DIETARY IRON INTAKE: ICD-10-CM

## 2021-05-04 DIAGNOSIS — E66.01 OBESITY, CLASS III, BMI 40-49.9 (MORBID OBESITY) (HCC): Primary | ICD-10-CM

## 2021-05-04 LAB
25(OH)D3 SERPL-MCNC: 45.5 NG/ML (ref 30–100)
ALBUMIN SERPL-MCNC: 3.8 G/DL (ref 3.5–5.2)
ALBUMIN/GLOB SERPL: 1.3 G/DL
ALP SERPL-CCNC: 96 U/L (ref 39–117)
ALT SERPL W P-5'-P-CCNC: 46 U/L (ref 1–33)
ANION GAP SERPL CALCULATED.3IONS-SCNC: 11.1 MMOL/L (ref 5–15)
AST SERPL-CCNC: 18 U/L (ref 1–32)
BASOPHILS # BLD AUTO: 0.05 10*3/MM3 (ref 0–0.2)
BASOPHILS NFR BLD AUTO: 0.5 % (ref 0–1.5)
BILIRUB SERPL-MCNC: 0.4 MG/DL (ref 0–1.2)
BUN SERPL-MCNC: 14 MG/DL (ref 6–20)
BUN/CREAT SERPL: 19.4 (ref 7–25)
CALCIUM SPEC-SCNC: 9.2 MG/DL (ref 8.6–10.5)
CHLORIDE SERPL-SCNC: 103 MMOL/L (ref 98–107)
CO2 SERPL-SCNC: 26.9 MMOL/L (ref 22–29)
CREAT SERPL-MCNC: 0.72 MG/DL (ref 0.57–1)
DEPRECATED RDW RBC AUTO: 45.6 FL (ref 37–54)
EOSINOPHIL # BLD AUTO: 0.11 10*3/MM3 (ref 0–0.4)
EOSINOPHIL NFR BLD AUTO: 1 % (ref 0.3–6.2)
ERYTHROCYTE [DISTWIDTH] IN BLOOD BY AUTOMATED COUNT: 13.7 % (ref 12.3–15.4)
FERRITIN SERPL-MCNC: 163 NG/ML (ref 13–150)
FOLATE SERPL-MCNC: 15.8 NG/ML (ref 4.78–24.2)
GFR SERPL CREATININE-BSD FRML MDRD: 88 ML/MIN/1.73
GLOBULIN UR ELPH-MCNC: 2.9 GM/DL
GLUCOSE SERPL-MCNC: 86 MG/DL (ref 65–99)
HCT VFR BLD AUTO: 42.6 % (ref 34–46.6)
HGB BLD-MCNC: 13.8 G/DL (ref 12–15.9)
IMM GRANULOCYTES # BLD AUTO: 0.04 10*3/MM3 (ref 0–0.05)
IMM GRANULOCYTES NFR BLD AUTO: 0.4 % (ref 0–0.5)
IRON 24H UR-MRATE: 30 MCG/DL (ref 37–145)
LYMPHOCYTES # BLD AUTO: 1.81 10*3/MM3 (ref 0.7–3.1)
LYMPHOCYTES NFR BLD AUTO: 16.5 % (ref 19.6–45.3)
MCH RBC QN AUTO: 29.1 PG (ref 26.6–33)
MCHC RBC AUTO-ENTMCNC: 32.4 G/DL (ref 31.5–35.7)
MCV RBC AUTO: 89.7 FL (ref 79–97)
MONOCYTES # BLD AUTO: 0.95 10*3/MM3 (ref 0.1–0.9)
MONOCYTES NFR BLD AUTO: 8.6 % (ref 5–12)
NEUTROPHILS NFR BLD AUTO: 73 % (ref 42.7–76)
NEUTROPHILS NFR BLD AUTO: 8.03 10*3/MM3 (ref 1.7–7)
NRBC BLD AUTO-RTO: 0.1 /100 WBC (ref 0–0.2)
PLATELET # BLD AUTO: 242 10*3/MM3 (ref 140–450)
PMV BLD AUTO: 11.5 FL (ref 6–12)
POTASSIUM SERPL-SCNC: 3.9 MMOL/L (ref 3.5–5.2)
PREALB SERPL-MCNC: 18.6 MG/DL (ref 20–40)
PROT SERPL-MCNC: 6.7 G/DL (ref 6–8.5)
QT INTERVAL: 411 MS
RBC # BLD AUTO: 4.75 10*6/MM3 (ref 3.77–5.28)
SODIUM SERPL-SCNC: 141 MMOL/L (ref 136–145)
WBC # BLD AUTO: 10.99 10*3/MM3 (ref 3.4–10.8)

## 2021-05-04 PROCEDURE — 36415 COLL VENOUS BLD VENIPUNCTURE: CPT

## 2021-05-04 PROCEDURE — 85025 COMPLETE CBC W/AUTO DIFF WBC: CPT

## 2021-05-04 PROCEDURE — 82728 ASSAY OF FERRITIN: CPT

## 2021-05-04 PROCEDURE — 80053 COMPREHEN METABOLIC PANEL: CPT

## 2021-05-04 PROCEDURE — 93010 ELECTROCARDIOGRAM REPORT: CPT | Performed by: INTERNAL MEDICINE

## 2021-05-04 PROCEDURE — 93005 ELECTROCARDIOGRAM TRACING: CPT

## 2021-05-04 PROCEDURE — 83921 ORGANIC ACID SINGLE QUANT: CPT

## 2021-05-04 PROCEDURE — 82746 ASSAY OF FOLIC ACID SERUM: CPT

## 2021-05-04 PROCEDURE — 84134 ASSAY OF PREALBUMIN: CPT

## 2021-05-04 PROCEDURE — 83540 ASSAY OF IRON: CPT

## 2021-05-04 PROCEDURE — 99024 POSTOP FOLLOW-UP VISIT: CPT | Performed by: NURSE PRACTITIONER

## 2021-05-04 PROCEDURE — 84425 ASSAY OF VITAMIN B-1: CPT

## 2021-05-04 PROCEDURE — 82306 VITAMIN D 25 HYDROXY: CPT

## 2021-05-08 LAB
Lab: NORMAL
METHYLMALONATE SERPL-SCNC: 84 NMOL/L (ref 0–378)
VIT B1 BLD-SCNC: 179.9 NMOL/L (ref 66.5–200)

## 2021-05-14 VITALS
WEIGHT: 286 LBS | DIASTOLIC BLOOD PRESSURE: 78 MMHG | HEART RATE: 77 BPM | SYSTOLIC BLOOD PRESSURE: 112 MMHG | OXYGEN SATURATION: 98 % | BODY MASS INDEX: 45.96 KG/M2 | HEIGHT: 66 IN

## 2021-05-14 VITALS
DIASTOLIC BLOOD PRESSURE: 82 MMHG | SYSTOLIC BLOOD PRESSURE: 155 MMHG | HEIGHT: 66 IN | OXYGEN SATURATION: 98 % | BODY MASS INDEX: 46.63 KG/M2 | HEART RATE: 71 BPM | WEIGHT: 290.12 LBS

## 2021-05-14 VITALS
WEIGHT: 293 LBS | SYSTOLIC BLOOD PRESSURE: 141 MMHG | HEIGHT: 66 IN | BODY MASS INDEX: 47.09 KG/M2 | DIASTOLIC BLOOD PRESSURE: 84 MMHG | OXYGEN SATURATION: 99 % | HEART RATE: 82 BPM

## 2021-05-14 VITALS — WEIGHT: 285 LBS

## 2021-05-15 VITALS
DIASTOLIC BLOOD PRESSURE: 76 MMHG | WEIGHT: 287.25 LBS | OXYGEN SATURATION: 97 % | SYSTOLIC BLOOD PRESSURE: 128 MMHG | BODY MASS INDEX: 46.16 KG/M2 | HEART RATE: 70 BPM | HEIGHT: 66 IN

## 2021-05-15 VITALS
SYSTOLIC BLOOD PRESSURE: 126 MMHG | DIASTOLIC BLOOD PRESSURE: 74 MMHG | HEIGHT: 66 IN | BODY MASS INDEX: 47.09 KG/M2 | HEART RATE: 82 BPM | OXYGEN SATURATION: 98 % | WEIGHT: 293 LBS

## 2021-05-15 VITALS
HEIGHT: 66 IN | DIASTOLIC BLOOD PRESSURE: 84 MMHG | OXYGEN SATURATION: 99 % | SYSTOLIC BLOOD PRESSURE: 130 MMHG | WEIGHT: 278.25 LBS | HEART RATE: 75 BPM | BODY MASS INDEX: 44.72 KG/M2

## 2021-05-16 VITALS
WEIGHT: 293 LBS | DIASTOLIC BLOOD PRESSURE: 79 MMHG | BODY MASS INDEX: 47.09 KG/M2 | SYSTOLIC BLOOD PRESSURE: 130 MMHG | HEART RATE: 70 BPM | OXYGEN SATURATION: 100 % | HEIGHT: 66 IN

## 2021-05-16 VITALS
WEIGHT: 288 LBS | DIASTOLIC BLOOD PRESSURE: 82 MMHG | BODY MASS INDEX: 46.28 KG/M2 | HEIGHT: 66 IN | HEART RATE: 72 BPM | SYSTOLIC BLOOD PRESSURE: 129 MMHG

## 2021-05-20 ENCOUNTER — OFFICE VISIT CONVERTED (OUTPATIENT)
Dept: FAMILY MEDICINE CLINIC | Facility: CLINIC | Age: 46
End: 2021-05-20
Attending: PHYSICIAN ASSISTANT

## 2021-05-26 ENCOUNTER — HOSPITAL ENCOUNTER (OUTPATIENT)
Dept: GENERAL RADIOLOGY | Facility: HOSPITAL | Age: 46
Discharge: HOME | End: 2021-05-26
Attending: PHYSICIAN ASSISTANT

## 2021-07-06 ENCOUNTER — LAB (OUTPATIENT)
Dept: LAB | Facility: HOSPITAL | Age: 46
End: 2021-07-06

## 2021-07-06 DIAGNOSIS — I10 ESSENTIAL HYPERTENSION: ICD-10-CM

## 2021-07-06 DIAGNOSIS — R53.82 CHRONIC FATIGUE: ICD-10-CM

## 2021-07-06 DIAGNOSIS — Z98.84 S/P LAPAROSCOPIC SLEEVE GASTRECTOMY: ICD-10-CM

## 2021-07-06 DIAGNOSIS — L65.9 ALOPECIA: ICD-10-CM

## 2021-07-06 DIAGNOSIS — E66.01 OBESITY, CLASS III, BMI 40-49.9 (MORBID OBESITY) (HCC): ICD-10-CM

## 2021-07-06 DIAGNOSIS — E66.01 OBESITY, CLASS III, BMI 40-49.9 (MORBID OBESITY) (HCC): Primary | ICD-10-CM

## 2021-07-06 DIAGNOSIS — E03.9 HYPOTHYROIDISM, UNSPECIFIED TYPE: ICD-10-CM

## 2021-07-06 LAB
25(OH)D3 SERPL-MCNC: 44.1 NG/ML (ref 30–100)
ALBUMIN SERPL-MCNC: 4.3 G/DL (ref 3.5–5.2)
ALBUMIN/GLOB SERPL: 1.5 G/DL
ALP SERPL-CCNC: 104 U/L (ref 39–117)
ALT SERPL W P-5'-P-CCNC: 31 U/L (ref 1–33)
ANION GAP SERPL CALCULATED.3IONS-SCNC: 8.8 MMOL/L (ref 5–15)
AST SERPL-CCNC: 17 U/L (ref 1–32)
BASOPHILS # BLD AUTO: 0.05 10*3/MM3 (ref 0–0.2)
BASOPHILS NFR BLD AUTO: 0.5 % (ref 0–1.5)
BILIRUB SERPL-MCNC: 0.5 MG/DL (ref 0–1.2)
BUN SERPL-MCNC: 18 MG/DL (ref 6–20)
BUN/CREAT SERPL: 15.1 (ref 7–25)
CALCIUM SPEC-SCNC: 9.2 MG/DL (ref 8.6–10.5)
CHLORIDE SERPL-SCNC: 101 MMOL/L (ref 98–107)
CO2 SERPL-SCNC: 27.2 MMOL/L (ref 22–29)
CREAT SERPL-MCNC: 1.19 MG/DL (ref 0.57–1)
DEPRECATED RDW RBC AUTO: 43.8 FL (ref 37–54)
EOSINOPHIL # BLD AUTO: 0.12 10*3/MM3 (ref 0–0.4)
EOSINOPHIL NFR BLD AUTO: 1.2 % (ref 0.3–6.2)
ERYTHROCYTE [DISTWIDTH] IN BLOOD BY AUTOMATED COUNT: 13.2 % (ref 12.3–15.4)
FERRITIN SERPL-MCNC: 147 NG/ML (ref 13–150)
FOLATE SERPL-MCNC: 18.3 NG/ML (ref 4.78–24.2)
GFR SERPL CREATININE-BSD FRML MDRD: 49 ML/MIN/1.73
GLOBULIN UR ELPH-MCNC: 2.9 GM/DL
GLUCOSE SERPL-MCNC: 87 MG/DL (ref 65–99)
HCT VFR BLD AUTO: 43.2 % (ref 34–46.6)
HGB BLD-MCNC: 14.2 G/DL (ref 12–15.9)
IMM GRANULOCYTES # BLD AUTO: 0.03 10*3/MM3 (ref 0–0.05)
IMM GRANULOCYTES NFR BLD AUTO: 0.3 % (ref 0–0.5)
IRON 24H UR-MRATE: 52 MCG/DL (ref 37–145)
LYMPHOCYTES # BLD AUTO: 1.98 10*3/MM3 (ref 0.7–3.1)
LYMPHOCYTES NFR BLD AUTO: 19.9 % (ref 19.6–45.3)
MCH RBC QN AUTO: 29.6 PG (ref 26.6–33)
MCHC RBC AUTO-ENTMCNC: 32.9 G/DL (ref 31.5–35.7)
MCV RBC AUTO: 90.2 FL (ref 79–97)
MONOCYTES # BLD AUTO: 0.8 10*3/MM3 (ref 0.1–0.9)
MONOCYTES NFR BLD AUTO: 8.1 % (ref 5–12)
NEUTROPHILS NFR BLD AUTO: 6.95 10*3/MM3 (ref 1.7–7)
NEUTROPHILS NFR BLD AUTO: 70 % (ref 42.7–76)
NRBC BLD AUTO-RTO: 0 /100 WBC (ref 0–0.2)
PLATELET # BLD AUTO: 273 10*3/MM3 (ref 140–450)
PMV BLD AUTO: 11.4 FL (ref 6–12)
POTASSIUM SERPL-SCNC: 4.3 MMOL/L (ref 3.5–5.2)
PREALB SERPL-MCNC: 22.7 MG/DL (ref 20–40)
PROT SERPL-MCNC: 7.2 G/DL (ref 6–8.5)
RBC # BLD AUTO: 4.79 10*6/MM3 (ref 3.77–5.28)
SODIUM SERPL-SCNC: 137 MMOL/L (ref 136–145)
T-UPTAKE NFR SERPL: 0.98 TBI (ref 0.8–1.3)
T4 SERPL-MCNC: 7.39 MCG/DL (ref 4.5–11.7)
TSH SERPL DL<=0.05 MIU/L-ACNC: 2.36 UIU/ML (ref 0.27–4.2)
WBC # BLD AUTO: 9.93 10*3/MM3 (ref 3.4–10.8)

## 2021-07-06 PROCEDURE — 82746 ASSAY OF FOLIC ACID SERUM: CPT

## 2021-07-06 PROCEDURE — 83540 ASSAY OF IRON: CPT

## 2021-07-06 PROCEDURE — 36415 COLL VENOUS BLD VENIPUNCTURE: CPT

## 2021-07-06 PROCEDURE — 84134 ASSAY OF PREALBUMIN: CPT

## 2021-07-06 PROCEDURE — 80053 COMPREHEN METABOLIC PANEL: CPT

## 2021-07-06 PROCEDURE — 82728 ASSAY OF FERRITIN: CPT

## 2021-07-06 PROCEDURE — 82306 VITAMIN D 25 HYDROXY: CPT

## 2021-07-06 PROCEDURE — 84436 ASSAY OF TOTAL THYROXINE: CPT

## 2021-07-06 PROCEDURE — 84479 ASSAY OF THYROID (T3 OR T4): CPT

## 2021-07-06 PROCEDURE — 85025 COMPLETE CBC W/AUTO DIFF WBC: CPT

## 2021-07-06 PROCEDURE — 84425 ASSAY OF VITAMIN B-1: CPT

## 2021-07-06 PROCEDURE — 84443 ASSAY THYROID STIM HORMONE: CPT

## 2021-07-06 PROCEDURE — 83921 ORGANIC ACID SINGLE QUANT: CPT

## 2021-07-09 LAB
Lab: NORMAL
METHYLMALONATE SERPL-SCNC: 104 NMOL/L (ref 0–378)

## 2021-07-10 LAB — VIT B1 BLD-SCNC: 187.8 NMOL/L (ref 66.5–200)

## 2021-07-15 VITALS
BODY MASS INDEX: 41.55 KG/M2 | DIASTOLIC BLOOD PRESSURE: 85 MMHG | WEIGHT: 258.56 LBS | OXYGEN SATURATION: 99 % | HEIGHT: 66 IN | HEART RATE: 80 BPM | SYSTOLIC BLOOD PRESSURE: 121 MMHG

## 2021-07-22 ENCOUNTER — OFFICE VISIT (OUTPATIENT)
Dept: BARIATRICS/WEIGHT MGMT | Facility: CLINIC | Age: 46
End: 2021-07-22

## 2021-07-22 VITALS
BODY MASS INDEX: 39.53 KG/M2 | TEMPERATURE: 98.4 F | DIASTOLIC BLOOD PRESSURE: 81 MMHG | HEIGHT: 66 IN | SYSTOLIC BLOOD PRESSURE: 124 MMHG | HEART RATE: 58 BPM | WEIGHT: 246 LBS

## 2021-07-22 DIAGNOSIS — N28.9 RENAL INSUFFICIENCY: Primary | ICD-10-CM

## 2021-07-22 DIAGNOSIS — Z98.84 S/P LAPAROSCOPIC SLEEVE GASTRECTOMY: ICD-10-CM

## 2021-07-22 DIAGNOSIS — E66.9 OBESITY, CLASS II, BMI 35-39.9: Primary | ICD-10-CM

## 2021-07-22 DIAGNOSIS — Z71.3 DIETARY COUNSELING: ICD-10-CM

## 2021-07-22 PROBLEM — E03.9 HYPOTHYROIDISM: Status: ACTIVE | Noted: 2018-06-19

## 2021-07-22 PROBLEM — E66.812 OBESITY, CLASS II, BMI 35-39.9: Status: ACTIVE | Noted: 2021-07-22

## 2021-07-22 PROBLEM — E66.01 OBESITY, CLASS III, BMI 40-49.9 (MORBID OBESITY): Status: RESOLVED | Noted: 2021-01-20 | Resolved: 2021-07-22

## 2021-07-22 PROBLEM — E66.813 OBESITY, CLASS III, BMI 40-49.9 (MORBID OBESITY): Status: RESOLVED | Noted: 2021-01-20 | Resolved: 2021-07-22

## 2021-07-22 PROCEDURE — 99213 OFFICE O/P EST LOW 20 MIN: CPT | Performed by: NURSE PRACTITIONER

## 2021-07-22 RX ORDER — BIOTIN 10 MG
TABLET ORAL
COMMUNITY
End: 2022-05-23

## 2021-07-22 RX ORDER — MULTIPLE VITAMINS W/ MINERALS TAB 9MG-400MCG
1 TAB ORAL DAILY
COMMUNITY
End: 2022-10-26

## 2021-07-27 ENCOUNTER — HOSPITAL ENCOUNTER (OUTPATIENT)
Dept: ULTRASOUND IMAGING | Facility: HOSPITAL | Age: 46
Discharge: HOME | End: 2021-07-27
Admitting: PHYSICIAN ASSISTANT

## 2021-07-27 DIAGNOSIS — N28.9 RENAL INSUFFICIENCY: ICD-10-CM

## 2021-07-27 PROCEDURE — 76775 US EXAM ABDO BACK WALL LIM: CPT

## 2021-08-30 DIAGNOSIS — F41.9 ANXIETY: ICD-10-CM

## 2021-08-30 DIAGNOSIS — E03.9 HYPOTHYROIDISM, UNSPECIFIED TYPE: Primary | ICD-10-CM

## 2021-08-31 RX ORDER — BUPROPION HYDROCHLORIDE 100 MG/1
TABLET, EXTENDED RELEASE ORAL
Qty: 180 TABLET | Refills: 1 | Status: SHIPPED | OUTPATIENT
Start: 2021-08-31 | End: 2021-11-03

## 2021-08-31 RX ORDER — LEVOTHYROXINE SODIUM 0.05 MG/1
TABLET ORAL
Qty: 90 TABLET | Refills: 1 | Status: SHIPPED | OUTPATIENT
Start: 2021-08-31 | End: 2022-02-21

## 2021-11-03 ENCOUNTER — OFFICE VISIT (OUTPATIENT)
Dept: BARIATRICS/WEIGHT MGMT | Facility: CLINIC | Age: 46
End: 2021-11-03

## 2021-11-03 VITALS
WEIGHT: 229 LBS | TEMPERATURE: 97.5 F | DIASTOLIC BLOOD PRESSURE: 77 MMHG | SYSTOLIC BLOOD PRESSURE: 126 MMHG | HEART RATE: 63 BPM | BODY MASS INDEX: 36.8 KG/M2 | RESPIRATION RATE: 18 BRPM | HEIGHT: 66 IN

## 2021-11-03 DIAGNOSIS — Z98.84 S/P LAPAROSCOPIC SLEEVE GASTRECTOMY: ICD-10-CM

## 2021-11-03 DIAGNOSIS — E66.9 OBESITY, CLASS II, BMI 35-39.9: Primary | ICD-10-CM

## 2021-11-03 DIAGNOSIS — Z71.3 DIETARY COUNSELING: ICD-10-CM

## 2021-11-03 PROCEDURE — 99213 OFFICE O/P EST LOW 20 MIN: CPT | Performed by: NURSE PRACTITIONER

## 2021-11-04 RX ORDER — LISINOPRIL 10 MG/1
TABLET ORAL
Qty: 180 TABLET | Refills: 0 | Status: SHIPPED | OUTPATIENT
Start: 2021-11-04 | End: 2021-11-22 | Stop reason: SDUPTHER

## 2021-11-17 ENCOUNTER — LAB (OUTPATIENT)
Dept: LAB | Facility: HOSPITAL | Age: 46
End: 2021-11-17

## 2021-11-17 DIAGNOSIS — N28.9 RENAL INSUFFICIENCY: ICD-10-CM

## 2021-11-17 LAB
ANION GAP SERPL CALCULATED.3IONS-SCNC: 7.4 MMOL/L (ref 5–15)
BUN SERPL-MCNC: 17 MG/DL (ref 6–20)
BUN/CREAT SERPL: 26.2 (ref 7–25)
CALCIUM SPEC-SCNC: 9.1 MG/DL (ref 8.6–10.5)
CHLORIDE SERPL-SCNC: 100 MMOL/L (ref 98–107)
CO2 SERPL-SCNC: 29.6 MMOL/L (ref 22–29)
CREAT SERPL-MCNC: 0.65 MG/DL (ref 0.57–1)
GFR SERPL CREATININE-BSD FRML MDRD: 98 ML/MIN/1.73
GLUCOSE SERPL-MCNC: 65 MG/DL (ref 65–99)
POTASSIUM SERPL-SCNC: 4 MMOL/L (ref 3.5–5.2)
SODIUM SERPL-SCNC: 137 MMOL/L (ref 136–145)

## 2021-11-17 PROCEDURE — 80048 BASIC METABOLIC PNL TOTAL CA: CPT

## 2021-11-17 PROCEDURE — 36415 COLL VENOUS BLD VENIPUNCTURE: CPT

## 2021-11-22 ENCOUNTER — OFFICE VISIT (OUTPATIENT)
Dept: FAMILY MEDICINE CLINIC | Facility: CLINIC | Age: 46
End: 2021-11-22

## 2021-11-22 VITALS
BODY MASS INDEX: 36.73 KG/M2 | OXYGEN SATURATION: 100 % | WEIGHT: 234 LBS | DIASTOLIC BLOOD PRESSURE: 84 MMHG | HEART RATE: 62 BPM | HEIGHT: 67 IN | SYSTOLIC BLOOD PRESSURE: 120 MMHG

## 2021-11-22 DIAGNOSIS — E03.9 HYPOTHYROIDISM, UNSPECIFIED TYPE: Primary | Chronic | ICD-10-CM

## 2021-11-22 DIAGNOSIS — E66.01 CLASS 2 SEVERE OBESITY DUE TO EXCESS CALORIES WITH SERIOUS COMORBIDITY AND BODY MASS INDEX (BMI) OF 37.0 TO 37.9 IN ADULT (HCC): Chronic | ICD-10-CM

## 2021-11-22 DIAGNOSIS — I10 PRIMARY HYPERTENSION: Chronic | ICD-10-CM

## 2021-11-22 PROCEDURE — 99214 OFFICE O/P EST MOD 30 MIN: CPT | Performed by: PHYSICIAN ASSISTANT

## 2021-11-22 RX ORDER — LISINOPRIL 10 MG/1
10 TABLET ORAL 2 TIMES DAILY
Qty: 180 TABLET | Refills: 1 | Status: SHIPPED | OUTPATIENT
Start: 2021-11-22 | End: 2022-02-21

## 2022-02-21 DIAGNOSIS — E03.9 HYPOTHYROIDISM, UNSPECIFIED TYPE: ICD-10-CM

## 2022-02-21 DIAGNOSIS — F41.9 ANXIETY: ICD-10-CM

## 2022-02-21 DIAGNOSIS — I10 PRIMARY HYPERTENSION: Chronic | ICD-10-CM

## 2022-02-21 RX ORDER — BUPROPION HYDROCHLORIDE 100 MG/1
TABLET, EXTENDED RELEASE ORAL
Qty: 180 TABLET | Refills: 0 | Status: SHIPPED | OUTPATIENT
Start: 2022-02-21 | End: 2022-03-24

## 2022-02-21 RX ORDER — LISINOPRIL 10 MG/1
TABLET ORAL
Qty: 180 TABLET | Refills: 0 | Status: SHIPPED | OUTPATIENT
Start: 2022-02-21 | End: 2022-05-23

## 2022-02-21 RX ORDER — LEVOTHYROXINE SODIUM 0.05 MG/1
TABLET ORAL
Qty: 90 TABLET | Refills: 0 | Status: SHIPPED | OUTPATIENT
Start: 2022-02-21 | End: 2022-05-23 | Stop reason: SDUPTHER

## 2022-03-24 ENCOUNTER — OFFICE VISIT (OUTPATIENT)
Dept: BARIATRICS/WEIGHT MGMT | Facility: CLINIC | Age: 47
End: 2022-03-24

## 2022-03-24 VITALS
DIASTOLIC BLOOD PRESSURE: 76 MMHG | SYSTOLIC BLOOD PRESSURE: 112 MMHG | HEART RATE: 58 BPM | HEIGHT: 66 IN | TEMPERATURE: 97.3 F | BODY MASS INDEX: 34.55 KG/M2 | RESPIRATION RATE: 18 BRPM | WEIGHT: 215 LBS

## 2022-03-24 DIAGNOSIS — D50.8 IRON DEFICIENCY ANEMIA SECONDARY TO INADEQUATE DIETARY IRON INTAKE: ICD-10-CM

## 2022-03-24 DIAGNOSIS — Z98.84 S/P LAPAROSCOPIC SLEEVE GASTRECTOMY: ICD-10-CM

## 2022-03-24 DIAGNOSIS — I10 PRIMARY HYPERTENSION: ICD-10-CM

## 2022-03-24 DIAGNOSIS — E66.9 OBESITY, CLASS II, BMI 35-39.9: Primary | ICD-10-CM

## 2022-03-24 DIAGNOSIS — E55.9 VITAMIN D DEFICIENCY: ICD-10-CM

## 2022-03-24 DIAGNOSIS — Z71.3 DIETARY COUNSELING: ICD-10-CM

## 2022-03-24 PROCEDURE — 99214 OFFICE O/P EST MOD 30 MIN: CPT | Performed by: NURSE PRACTITIONER

## 2022-03-24 RX ORDER — TRIAMCINOLONE ACETONIDE 1 MG/G
CREAM TOPICAL
COMMUNITY
Start: 2022-02-21 | End: 2022-03-24

## 2022-05-12 ENCOUNTER — LAB (OUTPATIENT)
Dept: LAB | Facility: HOSPITAL | Age: 47
End: 2022-05-12

## 2022-05-12 DIAGNOSIS — Z98.84 S/P LAPAROSCOPIC SLEEVE GASTRECTOMY: ICD-10-CM

## 2022-05-12 DIAGNOSIS — I10 PRIMARY HYPERTENSION: ICD-10-CM

## 2022-05-12 DIAGNOSIS — D50.8 IRON DEFICIENCY ANEMIA SECONDARY TO INADEQUATE DIETARY IRON INTAKE: ICD-10-CM

## 2022-05-12 DIAGNOSIS — E55.9 VITAMIN D DEFICIENCY: ICD-10-CM

## 2022-05-12 DIAGNOSIS — E66.9 OBESITY, CLASS II, BMI 35-39.9: ICD-10-CM

## 2022-05-12 LAB
25(OH)D3 SERPL-MCNC: 27.4 NG/ML (ref 30–100)
BASOPHILS # BLD AUTO: 0.06 10*3/MM3 (ref 0–0.2)
BASOPHILS NFR BLD AUTO: 0.7 % (ref 0–1.5)
DEPRECATED RDW RBC AUTO: 41.7 FL (ref 37–54)
EOSINOPHIL # BLD AUTO: 0.12 10*3/MM3 (ref 0–0.4)
EOSINOPHIL NFR BLD AUTO: 1.3 % (ref 0.3–6.2)
ERYTHROCYTE [DISTWIDTH] IN BLOOD BY AUTOMATED COUNT: 12.6 % (ref 12.3–15.4)
FERRITIN SERPL-MCNC: 131 NG/ML (ref 13–150)
FOLATE SERPL-MCNC: 18.4 NG/ML (ref 4.78–24.2)
HCT VFR BLD AUTO: 42.8 % (ref 34–46.6)
HGB BLD-MCNC: 14.2 G/DL (ref 12–15.9)
IMM GRANULOCYTES # BLD AUTO: 0.03 10*3/MM3 (ref 0–0.05)
IMM GRANULOCYTES NFR BLD AUTO: 0.3 % (ref 0–0.5)
LYMPHOCYTES # BLD AUTO: 1.91 10*3/MM3 (ref 0.7–3.1)
LYMPHOCYTES NFR BLD AUTO: 20.8 % (ref 19.6–45.3)
MCH RBC QN AUTO: 30.5 PG (ref 26.6–33)
MCHC RBC AUTO-ENTMCNC: 33.2 G/DL (ref 31.5–35.7)
MCV RBC AUTO: 91.8 FL (ref 79–97)
MONOCYTES # BLD AUTO: 0.78 10*3/MM3 (ref 0.1–0.9)
MONOCYTES NFR BLD AUTO: 8.5 % (ref 5–12)
NEUTROPHILS NFR BLD AUTO: 6.3 10*3/MM3 (ref 1.7–7)
NEUTROPHILS NFR BLD AUTO: 68.4 % (ref 42.7–76)
NRBC BLD AUTO-RTO: 0 /100 WBC (ref 0–0.2)
PLATELET # BLD AUTO: 268 10*3/MM3 (ref 140–450)
PMV BLD AUTO: 10.9 FL (ref 6–12)
PREALB SERPL-MCNC: 22.8 MG/DL (ref 20–40)
PTH-INTACT SERPL-MCNC: 22.2 PG/ML (ref 15–65)
RBC # BLD AUTO: 4.66 10*6/MM3 (ref 3.77–5.28)
WBC NRBC COR # BLD: 9.2 10*3/MM3 (ref 3.4–10.8)

## 2022-05-12 PROCEDURE — 83970 ASSAY OF PARATHORMONE: CPT

## 2022-05-12 PROCEDURE — 83540 ASSAY OF IRON: CPT

## 2022-05-12 PROCEDURE — 84425 ASSAY OF VITAMIN B-1: CPT

## 2022-05-12 PROCEDURE — 83921 ORGANIC ACID SINGLE QUANT: CPT

## 2022-05-12 PROCEDURE — 82728 ASSAY OF FERRITIN: CPT

## 2022-05-12 PROCEDURE — 82306 VITAMIN D 25 HYDROXY: CPT

## 2022-05-12 PROCEDURE — 85025 COMPLETE CBC W/AUTO DIFF WBC: CPT

## 2022-05-12 PROCEDURE — 84134 ASSAY OF PREALBUMIN: CPT

## 2022-05-12 PROCEDURE — 82746 ASSAY OF FOLIC ACID SERUM: CPT

## 2022-05-12 PROCEDURE — 36415 COLL VENOUS BLD VENIPUNCTURE: CPT

## 2022-05-12 PROCEDURE — 80053 COMPREHEN METABOLIC PANEL: CPT

## 2022-05-13 LAB
ALBUMIN SERPL-MCNC: 4.2 G/DL (ref 3.5–5.2)
ALBUMIN/GLOB SERPL: 1.4 G/DL
ALP SERPL-CCNC: 76 U/L (ref 39–117)
ALT SERPL W P-5'-P-CCNC: 20 U/L (ref 1–33)
ANION GAP SERPL CALCULATED.3IONS-SCNC: 9 MMOL/L (ref 5–15)
AST SERPL-CCNC: 15 U/L (ref 1–32)
BILIRUB SERPL-MCNC: 0.4 MG/DL (ref 0–1.2)
BUN SERPL-MCNC: 25 MG/DL (ref 6–20)
BUN/CREAT SERPL: 37.9 (ref 7–25)
CALCIUM SPEC-SCNC: 9.2 MG/DL (ref 8.6–10.5)
CHLORIDE SERPL-SCNC: 101 MMOL/L (ref 98–107)
CO2 SERPL-SCNC: 27 MMOL/L (ref 22–29)
CREAT SERPL-MCNC: 0.66 MG/DL (ref 0.57–1)
EGFRCR SERPLBLD CKD-EPI 2021: 109.7 ML/MIN/1.73
GLOBULIN UR ELPH-MCNC: 2.9 GM/DL
GLUCOSE SERPL-MCNC: 77 MG/DL (ref 65–99)
IRON 24H UR-MRATE: 74 MCG/DL (ref 37–145)
POTASSIUM SERPL-SCNC: 4.5 MMOL/L (ref 3.5–5.2)
PROT SERPL-MCNC: 7.1 G/DL (ref 6–8.5)
SODIUM SERPL-SCNC: 137 MMOL/L (ref 136–145)

## 2022-05-22 LAB — METHYLMALONATE SERPL-SCNC: 136 NMOL/L (ref 0–378)

## 2022-05-23 ENCOUNTER — OFFICE VISIT (OUTPATIENT)
Dept: FAMILY MEDICINE CLINIC | Facility: CLINIC | Age: 47
End: 2022-05-23

## 2022-05-23 VITALS
BODY MASS INDEX: 35.2 KG/M2 | HEART RATE: 58 BPM | DIASTOLIC BLOOD PRESSURE: 78 MMHG | OXYGEN SATURATION: 100 % | WEIGHT: 219 LBS | HEIGHT: 66 IN | SYSTOLIC BLOOD PRESSURE: 118 MMHG

## 2022-05-23 DIAGNOSIS — Z12.11 SCREEN FOR COLON CANCER: ICD-10-CM

## 2022-05-23 DIAGNOSIS — I10 PRIMARY HYPERTENSION: ICD-10-CM

## 2022-05-23 DIAGNOSIS — K21.9 GASTROESOPHAGEAL REFLUX DISEASE WITHOUT ESOPHAGITIS: ICD-10-CM

## 2022-05-23 DIAGNOSIS — Z12.31 ENCOUNTER FOR SCREENING MAMMOGRAM FOR MALIGNANT NEOPLASM OF BREAST: ICD-10-CM

## 2022-05-23 DIAGNOSIS — E03.9 HYPOTHYROIDISM, UNSPECIFIED TYPE: ICD-10-CM

## 2022-05-23 DIAGNOSIS — Z11.59 NEED FOR HEPATITIS C SCREENING TEST: ICD-10-CM

## 2022-05-23 DIAGNOSIS — Z12.11 SCREENING FOR COLON CANCER: Primary | ICD-10-CM

## 2022-05-23 DIAGNOSIS — J30.9 ALLERGIC RHINITIS, UNSPECIFIED SEASONALITY, UNSPECIFIED TRIGGER: ICD-10-CM

## 2022-05-23 DIAGNOSIS — F41.9 ANXIETY: ICD-10-CM

## 2022-05-23 PROCEDURE — 99213 OFFICE O/P EST LOW 20 MIN: CPT | Performed by: PHYSICIAN ASSISTANT

## 2022-05-23 RX ORDER — BUPROPION HYDROCHLORIDE 100 MG/1
100 TABLET ORAL 2 TIMES DAILY
Qty: 180 TABLET | Refills: 1 | Status: SHIPPED | OUTPATIENT
Start: 2022-05-23 | End: 2022-07-05

## 2022-05-23 RX ORDER — FAMOTIDINE 20 MG/1
20 TABLET, FILM COATED ORAL NIGHTLY
Qty: 90 TABLET | Refills: 1 | Status: SHIPPED | OUTPATIENT
Start: 2022-05-23 | End: 2022-10-26 | Stop reason: SDUPTHER

## 2022-05-23 RX ORDER — LEVOTHYROXINE SODIUM 0.05 MG/1
50 TABLET ORAL DAILY
Qty: 90 TABLET | Refills: 1 | Status: SHIPPED | OUTPATIENT
Start: 2022-05-23 | End: 2022-10-26 | Stop reason: SDUPTHER

## 2022-05-23 RX ORDER — CETIRIZINE HYDROCHLORIDE 10 MG/1
10 TABLET ORAL AS NEEDED
Qty: 90 TABLET | Refills: 1 | Status: SHIPPED | OUTPATIENT
Start: 2022-05-23 | End: 2022-10-26 | Stop reason: SDUPTHER

## 2022-05-26 LAB — VIT B1 BLD-SCNC: 188.5 NMOL/L (ref 66.5–200)

## 2022-06-06 RX ORDER — LISINOPRIL 10 MG/1
10 TABLET ORAL DAILY
Qty: 90 TABLET | Refills: 1 | Status: SHIPPED | OUTPATIENT
Start: 2022-06-06 | End: 2022-10-26 | Stop reason: SDUPTHER

## 2022-06-07 RX ORDER — ERGOCALCIFEROL 1.25 MG/1
50000 CAPSULE ORAL
Qty: 12 CAPSULE | Refills: 0 | Status: SHIPPED | OUTPATIENT
Start: 2022-06-07 | End: 2022-08-24

## 2022-06-27 ENCOUNTER — TELEPHONE (OUTPATIENT)
Dept: FAMILY MEDICINE CLINIC | Facility: CLINIC | Age: 47
End: 2022-06-27

## 2022-06-27 ENCOUNTER — LAB (OUTPATIENT)
Dept: LAB | Facility: HOSPITAL | Age: 47
End: 2022-06-27

## 2022-06-27 DIAGNOSIS — I10 PRIMARY HYPERTENSION: ICD-10-CM

## 2022-06-27 DIAGNOSIS — E03.9 HYPOTHYROIDISM, UNSPECIFIED TYPE: ICD-10-CM

## 2022-06-27 DIAGNOSIS — Z11.59 NEED FOR HEPATITIS C SCREENING TEST: ICD-10-CM

## 2022-06-27 LAB
BACTERIA UR QL AUTO: NORMAL /HPF
BILIRUB UR QL STRIP: NEGATIVE
CHOLEST SERPL-MCNC: 197 MG/DL (ref 0–200)
CLARITY UR: CLEAR
COLOR UR: YELLOW
GLUCOSE UR STRIP-MCNC: NEGATIVE MG/DL
HCV AB SER DONR QL: NORMAL
HDLC SERPL-MCNC: 55 MG/DL (ref 40–60)
HGB UR QL STRIP.AUTO: NEGATIVE
HYALINE CASTS UR QL AUTO: NORMAL /LPF
KETONES UR QL STRIP: NEGATIVE
LDLC SERPL CALC-MCNC: 122 MG/DL (ref 0–100)
LDLC/HDLC SERPL: 2.18 {RATIO}
LEUKOCYTE ESTERASE UR QL STRIP.AUTO: ABNORMAL
NITRITE UR QL STRIP: NEGATIVE
PH UR STRIP.AUTO: 7.5 [PH] (ref 5–8)
PROT UR QL STRIP: NEGATIVE
RBC # UR STRIP: NORMAL /HPF
REF LAB TEST METHOD: NORMAL
SP GR UR STRIP: 1.02 (ref 1–1.03)
SQUAMOUS #/AREA URNS HPF: NORMAL /HPF
T4 FREE SERPL-MCNC: 1.1 NG/DL (ref 0.93–1.7)
TRIGL SERPL-MCNC: 111 MG/DL (ref 0–150)
TSH SERPL DL<=0.05 MIU/L-ACNC: 1.81 UIU/ML (ref 0.27–4.2)
UROBILINOGEN UR QL STRIP: ABNORMAL
VLDLC SERPL-MCNC: 20 MG/DL (ref 5–40)
WBC # UR STRIP: NORMAL /HPF

## 2022-06-27 PROCEDURE — 36415 COLL VENOUS BLD VENIPUNCTURE: CPT

## 2022-06-27 PROCEDURE — 84439 ASSAY OF FREE THYROXINE: CPT

## 2022-06-27 PROCEDURE — 80061 LIPID PANEL: CPT

## 2022-06-27 PROCEDURE — 86803 HEPATITIS C AB TEST: CPT

## 2022-06-27 PROCEDURE — 81001 URINALYSIS AUTO W/SCOPE: CPT

## 2022-06-27 PROCEDURE — 84443 ASSAY THYROID STIM HORMONE: CPT

## 2022-06-28 ENCOUNTER — TELEPHONE (OUTPATIENT)
Dept: FAMILY MEDICINE CLINIC | Facility: CLINIC | Age: 47
End: 2022-06-28

## 2022-07-05 DIAGNOSIS — F41.9 ANXIETY: Primary | ICD-10-CM

## 2022-07-05 RX ORDER — BUPROPION HYDROCHLORIDE 100 MG/1
100 TABLET, EXTENDED RELEASE ORAL 2 TIMES DAILY
Qty: 180 TABLET | Refills: 1 | Status: SHIPPED | OUTPATIENT
Start: 2022-07-05 | End: 2022-10-26

## 2022-07-25 ENCOUNTER — HOSPITAL ENCOUNTER (OUTPATIENT)
Dept: MAMMOGRAPHY | Facility: HOSPITAL | Age: 47
Discharge: HOME | End: 2022-07-25
Admitting: PHYSICIAN ASSISTANT

## 2022-07-25 DIAGNOSIS — Z12.31 ENCOUNTER FOR SCREENING MAMMOGRAM FOR MALIGNANT NEOPLASM OF BREAST: ICD-10-CM

## 2022-07-25 PROCEDURE — 77063 BREAST TOMOSYNTHESIS BI: CPT

## 2022-07-25 PROCEDURE — 77067 SCR MAMMO BI INCL CAD: CPT

## 2022-10-26 ENCOUNTER — OFFICE VISIT (OUTPATIENT)
Dept: FAMILY MEDICINE CLINIC | Facility: CLINIC | Age: 47
End: 2022-10-26

## 2022-10-26 ENCOUNTER — OFFICE VISIT (OUTPATIENT)
Dept: OBSTETRICS AND GYNECOLOGY | Facility: CLINIC | Age: 47
End: 2022-10-26

## 2022-10-26 VITALS
SYSTOLIC BLOOD PRESSURE: 119 MMHG | HEART RATE: 76 BPM | BODY MASS INDEX: 37.09 KG/M2 | DIASTOLIC BLOOD PRESSURE: 78 MMHG | HEIGHT: 66 IN | WEIGHT: 230.8 LBS

## 2022-10-26 VITALS
BODY MASS INDEX: 37.09 KG/M2 | SYSTOLIC BLOOD PRESSURE: 129 MMHG | HEIGHT: 66 IN | DIASTOLIC BLOOD PRESSURE: 74 MMHG | HEART RATE: 67 BPM | WEIGHT: 230.8 LBS | OXYGEN SATURATION: 100 %

## 2022-10-26 DIAGNOSIS — E66.01 MORBID (SEVERE) OBESITY DUE TO EXCESS CALORIES: ICD-10-CM

## 2022-10-26 DIAGNOSIS — E03.9 HYPOTHYROIDISM, UNSPECIFIED TYPE: ICD-10-CM

## 2022-10-26 DIAGNOSIS — K21.9 GASTROESOPHAGEAL REFLUX DISEASE WITHOUT ESOPHAGITIS: ICD-10-CM

## 2022-10-26 DIAGNOSIS — F41.9 ANXIETY AND DEPRESSION: Primary | ICD-10-CM

## 2022-10-26 DIAGNOSIS — F32.A ANXIETY AND DEPRESSION: Primary | ICD-10-CM

## 2022-10-26 DIAGNOSIS — E55.9 VITAMIN D DEFICIENCY: ICD-10-CM

## 2022-10-26 DIAGNOSIS — Z01.419 WELL WOMAN EXAM WITH ROUTINE GYNECOLOGICAL EXAM: Primary | ICD-10-CM

## 2022-10-26 DIAGNOSIS — J30.9 ALLERGIC RHINITIS, UNSPECIFIED SEASONALITY, UNSPECIFIED TRIGGER: ICD-10-CM

## 2022-10-26 DIAGNOSIS — I10 PRIMARY HYPERTENSION: ICD-10-CM

## 2022-10-26 DIAGNOSIS — N93.9 ABNORMAL UTERINE BLEEDING (AUB): ICD-10-CM

## 2022-10-26 LAB
T4 FREE SERPL-MCNC: 1.38 NG/DL (ref 0.93–1.7)
TSH SERPL DL<=0.05 MIU/L-ACNC: 1.87 UIU/ML (ref 0.27–4.2)

## 2022-10-26 PROCEDURE — 84439 ASSAY OF FREE THYROXINE: CPT | Performed by: OBSTETRICS & GYNECOLOGY

## 2022-10-26 PROCEDURE — 99386 PREV VISIT NEW AGE 40-64: CPT | Performed by: OBSTETRICS & GYNECOLOGY

## 2022-10-26 PROCEDURE — 84443 ASSAY THYROID STIM HORMONE: CPT | Performed by: OBSTETRICS & GYNECOLOGY

## 2022-10-26 PROCEDURE — 85027 COMPLETE CBC AUTOMATED: CPT | Performed by: OBSTETRICS & GYNECOLOGY

## 2022-10-26 PROCEDURE — G0123 SCREEN CERV/VAG THIN LAYER: HCPCS | Performed by: OBSTETRICS & GYNECOLOGY

## 2022-10-26 PROCEDURE — 99214 OFFICE O/P EST MOD 30 MIN: CPT | Performed by: NURSE PRACTITIONER

## 2022-10-26 PROCEDURE — 87624 HPV HI-RISK TYP POOLED RSLT: CPT | Performed by: OBSTETRICS & GYNECOLOGY

## 2022-10-26 RX ORDER — CARBOXYMETHYLCELLULOSE/CITRIC 0.75 G
3 CAPSULE ORAL 2 TIMES DAILY WITH MEALS
Qty: 540 CAPSULE | Refills: 0 | Status: SHIPPED | OUTPATIENT
Start: 2022-10-26 | End: 2023-01-23

## 2022-10-26 RX ORDER — CETIRIZINE HYDROCHLORIDE 10 MG/1
10 TABLET ORAL AS NEEDED
Qty: 90 TABLET | Refills: 1 | Status: SHIPPED | OUTPATIENT
Start: 2022-10-26 | End: 2022-10-27 | Stop reason: SDUPTHER

## 2022-10-26 RX ORDER — MULTIPLE VITAMINS W/ MINERALS TAB 9MG-400MCG
1 TAB ORAL DAILY
COMMUNITY

## 2022-10-26 RX ORDER — FAMOTIDINE 20 MG/1
20 TABLET, FILM COATED ORAL NIGHTLY
Qty: 90 TABLET | Refills: 1 | Status: SHIPPED | OUTPATIENT
Start: 2022-10-26

## 2022-10-26 RX ORDER — LEVOTHYROXINE SODIUM 0.05 MG/1
50 TABLET ORAL DAILY
Qty: 90 TABLET | Refills: 1 | Status: SHIPPED | OUTPATIENT
Start: 2022-10-26 | End: 2023-02-01 | Stop reason: SDUPTHER

## 2022-10-26 RX ORDER — BUPROPION HYDROCHLORIDE 75 MG/1
75 TABLET ORAL 2 TIMES DAILY
Qty: 60 TABLET | Refills: 0 | Status: SHIPPED | OUTPATIENT
Start: 2022-10-26 | End: 2022-12-06 | Stop reason: SDUPTHER

## 2022-10-26 RX ORDER — LISINOPRIL 10 MG/1
10 TABLET ORAL DAILY
Qty: 90 TABLET | Refills: 1 | Status: SHIPPED | OUTPATIENT
Start: 2022-10-26 | End: 2023-02-01 | Stop reason: SDUPTHER

## 2022-10-27 ENCOUNTER — TELEPHONE (OUTPATIENT)
Dept: FAMILY MEDICINE CLINIC | Facility: CLINIC | Age: 47
End: 2022-10-27

## 2022-10-27 DIAGNOSIS — J30.9 ALLERGIC RHINITIS, UNSPECIFIED SEASONALITY, UNSPECIFIED TRIGGER: ICD-10-CM

## 2022-10-27 LAB
DEPRECATED RDW RBC AUTO: 40.4 FL (ref 37–54)
ERYTHROCYTE [DISTWIDTH] IN BLOOD BY AUTOMATED COUNT: 12.4 % (ref 12.3–15.4)
HCT VFR BLD AUTO: 42.3 % (ref 34–46.6)
HGB BLD-MCNC: 14.2 G/DL (ref 12–15.9)
MCH RBC QN AUTO: 30.3 PG (ref 26.6–33)
MCHC RBC AUTO-ENTMCNC: 33.6 G/DL (ref 31.5–35.7)
MCV RBC AUTO: 90.2 FL (ref 79–97)
PLATELET # BLD AUTO: 288 10*3/MM3 (ref 140–450)
PMV BLD AUTO: 10.9 FL (ref 6–12)
RBC # BLD AUTO: 4.69 10*6/MM3 (ref 3.77–5.28)
WBC NRBC COR # BLD: 8.8 10*3/MM3 (ref 3.4–10.8)

## 2022-10-27 RX ORDER — CETIRIZINE HYDROCHLORIDE 10 MG/1
10 TABLET ORAL DAILY PRN
Qty: 90 TABLET | Refills: 1 | Status: SHIPPED | OUTPATIENT
Start: 2022-10-27 | End: 2022-11-03 | Stop reason: SDUPTHER

## 2022-10-28 ENCOUNTER — PATIENT ROUNDING (BHMG ONLY) (OUTPATIENT)
Dept: OBSTETRICS AND GYNECOLOGY | Facility: CLINIC | Age: 47
End: 2022-10-28

## 2022-10-31 ENCOUNTER — OFFICE VISIT (OUTPATIENT)
Dept: OBSTETRICS AND GYNECOLOGY | Facility: CLINIC | Age: 47
End: 2022-10-31

## 2022-10-31 VITALS
WEIGHT: 232 LBS | BODY MASS INDEX: 37.28 KG/M2 | DIASTOLIC BLOOD PRESSURE: 74 MMHG | SYSTOLIC BLOOD PRESSURE: 112 MMHG | HEART RATE: 61 BPM | HEIGHT: 66 IN

## 2022-10-31 DIAGNOSIS — N93.9 ABNORMAL UTERINE BLEEDING (AUB): Primary | ICD-10-CM

## 2022-10-31 DIAGNOSIS — N83.201 CYST OF RIGHT OVARY: ICD-10-CM

## 2022-10-31 PROCEDURE — 99212 OFFICE O/P EST SF 10 MIN: CPT | Performed by: OBSTETRICS & GYNECOLOGY

## 2022-11-03 DIAGNOSIS — J30.9 ALLERGIC RHINITIS, UNSPECIFIED SEASONALITY, UNSPECIFIED TRIGGER: ICD-10-CM

## 2022-11-03 LAB
CYTOLOGIST CVX/VAG CYTO: NORMAL
CYTOLOGY CVX/VAG DOC CYTO: NORMAL
CYTOLOGY CVX/VAG DOC THIN PREP: NORMAL
DX ICD CODE: NORMAL
HIV 1 & 2 AB SER-IMP: NORMAL
HPV I/H RISK 4 DNA CVX QL PROBE+SIG AMP: NEGATIVE
OTHER STN SPEC: NORMAL
STAT OF ADQ CVX/VAG CYTO-IMP: NORMAL

## 2022-11-03 RX ORDER — CETIRIZINE HYDROCHLORIDE 10 MG/1
10 TABLET ORAL DAILY PRN
Qty: 90 TABLET | Refills: 1 | Status: SHIPPED | OUTPATIENT
Start: 2022-11-03

## 2022-11-23 ENCOUNTER — OFFICE VISIT (OUTPATIENT)
Dept: BARIATRICS/WEIGHT MGMT | Facility: CLINIC | Age: 47
End: 2022-11-23

## 2022-11-23 VITALS
BODY MASS INDEX: 37.12 KG/M2 | OXYGEN SATURATION: 100 % | TEMPERATURE: 96.9 F | RESPIRATION RATE: 16 BRPM | HEART RATE: 54 BPM | DIASTOLIC BLOOD PRESSURE: 86 MMHG | WEIGHT: 231 LBS | SYSTOLIC BLOOD PRESSURE: 133 MMHG | HEIGHT: 66 IN

## 2022-11-23 DIAGNOSIS — K21.9 GASTROESOPHAGEAL REFLUX DISEASE, UNSPECIFIED WHETHER ESOPHAGITIS PRESENT: ICD-10-CM

## 2022-11-23 DIAGNOSIS — R53.82 CHRONIC FATIGUE: ICD-10-CM

## 2022-11-23 DIAGNOSIS — Z98.84 S/P LAPAROSCOPIC SLEEVE GASTRECTOMY: ICD-10-CM

## 2022-11-23 DIAGNOSIS — E66.9 OBESITY, CLASS II, BMI 35-39.9: Primary | ICD-10-CM

## 2022-11-23 PROCEDURE — 99213 OFFICE O/P EST LOW 20 MIN: CPT | Performed by: NURSE PRACTITIONER

## 2022-11-23 RX ORDER — SEMAGLUTIDE 0.25 MG/.5ML
0.25 INJECTION, SOLUTION SUBCUTANEOUS WEEKLY
Qty: 2 ML | Refills: 0 | Status: SHIPPED | OUTPATIENT
Start: 2022-11-23 | End: 2023-01-17 | Stop reason: SDUPTHER

## 2022-11-23 RX ORDER — FLUTICASONE PROPIONATE 50 MCG
SPRAY, SUSPENSION (ML) NASAL
COMMUNITY
Start: 2022-11-10

## 2022-12-06 DIAGNOSIS — F41.9 ANXIETY AND DEPRESSION: ICD-10-CM

## 2022-12-06 DIAGNOSIS — F32.A ANXIETY AND DEPRESSION: ICD-10-CM

## 2022-12-06 RX ORDER — BUPROPION HYDROCHLORIDE 75 MG/1
75 TABLET ORAL 2 TIMES DAILY
Qty: 60 TABLET | Refills: 1 | Status: SHIPPED | OUTPATIENT
Start: 2022-12-06 | End: 2023-02-01 | Stop reason: SDUPTHER

## 2023-01-18 RX ORDER — SEMAGLUTIDE 0.25 MG/.5ML
0.25 INJECTION, SOLUTION SUBCUTANEOUS WEEKLY
Qty: 2 ML | Refills: 0 | Status: SHIPPED | OUTPATIENT
Start: 2023-01-18 | End: 2023-02-03

## 2023-01-23 ENCOUNTER — OFFICE VISIT (OUTPATIENT)
Dept: OBSTETRICS AND GYNECOLOGY | Facility: CLINIC | Age: 48
End: 2023-01-23
Payer: COMMERCIAL

## 2023-01-23 VITALS
DIASTOLIC BLOOD PRESSURE: 83 MMHG | HEART RATE: 62 BPM | SYSTOLIC BLOOD PRESSURE: 130 MMHG | WEIGHT: 234 LBS | BODY MASS INDEX: 37.77 KG/M2

## 2023-01-23 DIAGNOSIS — N83.201 CYST OF RIGHT OVARY: ICD-10-CM

## 2023-01-23 DIAGNOSIS — Z91.89 INCREASED RISK OF BREAST CANCER: Primary | ICD-10-CM

## 2023-01-23 DIAGNOSIS — N93.9 ABNORMAL UTERINE BLEEDING (AUB): ICD-10-CM

## 2023-01-23 PROCEDURE — 99213 OFFICE O/P EST LOW 20 MIN: CPT | Performed by: OBSTETRICS & GYNECOLOGY

## 2023-02-01 ENCOUNTER — OFFICE VISIT (OUTPATIENT)
Dept: FAMILY MEDICINE CLINIC | Facility: CLINIC | Age: 48
End: 2023-02-01
Payer: COMMERCIAL

## 2023-02-01 VITALS
HEIGHT: 66 IN | HEART RATE: 70 BPM | TEMPERATURE: 96.1 F | WEIGHT: 234 LBS | BODY MASS INDEX: 37.61 KG/M2 | SYSTOLIC BLOOD PRESSURE: 122 MMHG | DIASTOLIC BLOOD PRESSURE: 73 MMHG | OXYGEN SATURATION: 99 %

## 2023-02-01 DIAGNOSIS — E03.9 HYPOTHYROIDISM, UNSPECIFIED TYPE: ICD-10-CM

## 2023-02-01 DIAGNOSIS — I10 PRIMARY HYPERTENSION: ICD-10-CM

## 2023-02-01 DIAGNOSIS — E78.5 DYSLIPIDEMIA WITH ELEVATED LOW DENSITY LIPOPROTEIN (LDL) CHOLESTEROL AND ABNORMALLY LOW HIGH DENSITY LIPOPROTEIN CHOLESTEROL: ICD-10-CM

## 2023-02-01 DIAGNOSIS — F41.9 ANXIETY AND DEPRESSION: ICD-10-CM

## 2023-02-01 DIAGNOSIS — K21.9 GASTROESOPHAGEAL REFLUX DISEASE, UNSPECIFIED WHETHER ESOPHAGITIS PRESENT: Primary | ICD-10-CM

## 2023-02-01 DIAGNOSIS — F32.A ANXIETY AND DEPRESSION: ICD-10-CM

## 2023-02-01 DIAGNOSIS — E55.9 VITAMIN D DEFICIENCY: ICD-10-CM

## 2023-02-01 LAB
ALBUMIN SERPL-MCNC: 4.8 G/DL (ref 3.5–5.2)
ALBUMIN/GLOB SERPL: 1.8 G/DL
ALP SERPL-CCNC: 87 U/L (ref 39–117)
ALT SERPL W P-5'-P-CCNC: 16 U/L (ref 1–33)
AMYLASE SERPL-CCNC: 48 U/L (ref 28–100)
ANION GAP SERPL CALCULATED.3IONS-SCNC: 7 MMOL/L (ref 5–15)
AST SERPL-CCNC: 13 U/L (ref 1–32)
BASOPHILS # BLD AUTO: 0.05 10*3/MM3 (ref 0–0.2)
BASOPHILS NFR BLD AUTO: 0.7 % (ref 0–1.5)
BILIRUB SERPL-MCNC: 0.7 MG/DL (ref 0–1.2)
BUN SERPL-MCNC: 16 MG/DL (ref 6–20)
BUN/CREAT SERPL: 18.2 (ref 7–25)
CALCIUM SPEC-SCNC: 9.8 MG/DL (ref 8.6–10.5)
CHLORIDE SERPL-SCNC: 100 MMOL/L (ref 98–107)
CHOLEST SERPL-MCNC: 161 MG/DL (ref 0–200)
CO2 SERPL-SCNC: 31 MMOL/L (ref 22–29)
CREAT SERPL-MCNC: 0.88 MG/DL (ref 0.57–1)
DEPRECATED RDW RBC AUTO: 38.8 FL (ref 37–54)
EGFRCR SERPLBLD CKD-EPI 2021: 81.7 ML/MIN/1.73
EOSINOPHIL # BLD AUTO: 0.14 10*3/MM3 (ref 0–0.4)
EOSINOPHIL NFR BLD AUTO: 2 % (ref 0.3–6.2)
ERYTHROCYTE [DISTWIDTH] IN BLOOD BY AUTOMATED COUNT: 12 % (ref 12.3–15.4)
GLOBULIN UR ELPH-MCNC: 2.6 GM/DL
GLUCOSE SERPL-MCNC: 87 MG/DL (ref 65–99)
HBA1C MFR BLD: 5.3 % (ref 4.8–5.6)
HCT VFR BLD AUTO: 45.6 % (ref 34–46.6)
HDLC SERPL-MCNC: 55 MG/DL (ref 40–60)
HGB BLD-MCNC: 15.4 G/DL (ref 12–15.9)
IMM GRANULOCYTES # BLD AUTO: 0.02 10*3/MM3 (ref 0–0.05)
IMM GRANULOCYTES NFR BLD AUTO: 0.3 % (ref 0–0.5)
LDLC SERPL CALC-MCNC: 90 MG/DL (ref 0–100)
LDLC/HDLC SERPL: 1.61 {RATIO}
LIPASE SERPL-CCNC: 29 U/L (ref 13–60)
LYMPHOCYTES # BLD AUTO: 2.22 10*3/MM3 (ref 0.7–3.1)
LYMPHOCYTES NFR BLD AUTO: 31.4 % (ref 19.6–45.3)
MCH RBC QN AUTO: 30 PG (ref 26.6–33)
MCHC RBC AUTO-ENTMCNC: 33.8 G/DL (ref 31.5–35.7)
MCV RBC AUTO: 88.9 FL (ref 79–97)
MONOCYTES # BLD AUTO: 0.66 10*3/MM3 (ref 0.1–0.9)
MONOCYTES NFR BLD AUTO: 9.3 % (ref 5–12)
NEUTROPHILS NFR BLD AUTO: 3.98 10*3/MM3 (ref 1.7–7)
NEUTROPHILS NFR BLD AUTO: 56.3 % (ref 42.7–76)
NRBC BLD AUTO-RTO: 0 /100 WBC (ref 0–0.2)
PLATELET # BLD AUTO: 271 10*3/MM3 (ref 140–450)
PMV BLD AUTO: 10.6 FL (ref 6–12)
POTASSIUM SERPL-SCNC: 4.6 MMOL/L (ref 3.5–5.2)
PROT SERPL-MCNC: 7.4 G/DL (ref 6–8.5)
RBC # BLD AUTO: 5.13 10*6/MM3 (ref 3.77–5.28)
SODIUM SERPL-SCNC: 138 MMOL/L (ref 136–145)
T4 FREE SERPL-MCNC: 1.6 NG/DL (ref 0.93–1.7)
TRIGL SERPL-MCNC: 87 MG/DL (ref 0–150)
TSH SERPL DL<=0.05 MIU/L-ACNC: 2.2 UIU/ML (ref 0.27–4.2)
VLDLC SERPL-MCNC: 16 MG/DL (ref 5–40)
WBC NRBC COR # BLD: 7.07 10*3/MM3 (ref 3.4–10.8)

## 2023-02-01 PROCEDURE — 86800 THYROGLOBULIN ANTIBODY: CPT | Performed by: NURSE PRACTITIONER

## 2023-02-01 PROCEDURE — 80061 LIPID PANEL: CPT | Performed by: NURSE PRACTITIONER

## 2023-02-01 PROCEDURE — 83036 HEMOGLOBIN GLYCOSYLATED A1C: CPT | Performed by: NURSE PRACTITIONER

## 2023-02-01 PROCEDURE — 86376 MICROSOMAL ANTIBODY EACH: CPT | Performed by: NURSE PRACTITIONER

## 2023-02-01 PROCEDURE — 99214 OFFICE O/P EST MOD 30 MIN: CPT | Performed by: NURSE PRACTITIONER

## 2023-02-01 PROCEDURE — 82150 ASSAY OF AMYLASE: CPT | Performed by: NURSE PRACTITIONER

## 2023-02-01 PROCEDURE — 80050 GENERAL HEALTH PANEL: CPT | Performed by: NURSE PRACTITIONER

## 2023-02-01 PROCEDURE — 84439 ASSAY OF FREE THYROXINE: CPT | Performed by: NURSE PRACTITIONER

## 2023-02-01 PROCEDURE — 83690 ASSAY OF LIPASE: CPT | Performed by: NURSE PRACTITIONER

## 2023-02-01 PROCEDURE — 84482 T3 REVERSE: CPT | Performed by: NURSE PRACTITIONER

## 2023-02-01 RX ORDER — LISINOPRIL 10 MG/1
10 TABLET ORAL DAILY
Qty: 90 TABLET | Refills: 1 | Status: SHIPPED | OUTPATIENT
Start: 2023-02-01

## 2023-02-01 RX ORDER — MAGNESIUM CARB/ALUMINUM HYDROX 105-160MG
TABLET,CHEWABLE ORAL ONCE
COMMUNITY
End: 2023-04-04

## 2023-02-01 RX ORDER — BUPROPION HYDROCHLORIDE 75 MG/1
75 TABLET ORAL 2 TIMES DAILY
Qty: 60 TABLET | Refills: 3 | Status: SHIPPED | OUTPATIENT
Start: 2023-02-01 | End: 2023-03-08 | Stop reason: SDUPTHER

## 2023-02-01 RX ORDER — LEVOTHYROXINE SODIUM 0.05 MG/1
50 TABLET ORAL DAILY
Qty: 90 TABLET | Refills: 1 | Status: SHIPPED | OUTPATIENT
Start: 2023-02-01

## 2023-02-02 LAB
THYROGLOB AB SERPL-ACNC: <1 IU/ML (ref 0–0.9)
THYROPEROXIDASE AB SERPL-ACNC: <9 IU/ML (ref 0–34)

## 2023-02-03 RX ORDER — SEMAGLUTIDE 0.5 MG/.5ML
0.5 INJECTION, SOLUTION SUBCUTANEOUS WEEKLY
Qty: 2 ML | Refills: 0 | Status: SHIPPED | OUTPATIENT
Start: 2023-02-03 | End: 2023-02-03

## 2023-02-03 RX ORDER — SEMAGLUTIDE 0.25 MG/.5ML
0.25 INJECTION, SOLUTION SUBCUTANEOUS WEEKLY
Qty: 2 ML | Refills: 0 | Status: SHIPPED | OUTPATIENT
Start: 2023-02-03 | End: 2023-02-23

## 2023-02-03 RX ORDER — SEMAGLUTIDE 0.25 MG/.5ML
INJECTION, SOLUTION SUBCUTANEOUS
Qty: 4 ML | Refills: 0 | OUTPATIENT
Start: 2023-02-03

## 2023-02-06 LAB — T3REVERSE SERPL-MCNC: 22.6 NG/DL (ref 9.2–24.1)

## 2023-02-07 LAB — REF LAB TEST METHOD: NORMAL

## 2023-02-08 ENCOUNTER — PATIENT ROUNDING (BHMG ONLY) (OUTPATIENT)
Dept: FAMILY MEDICINE CLINIC | Facility: CLINIC | Age: 48
End: 2023-02-08
Payer: COMMERCIAL

## 2023-02-23 ENCOUNTER — OFFICE VISIT (OUTPATIENT)
Dept: BARIATRICS/WEIGHT MGMT | Facility: CLINIC | Age: 48
End: 2023-02-23
Payer: COMMERCIAL

## 2023-02-23 VITALS
TEMPERATURE: 97.5 F | HEART RATE: 61 BPM | WEIGHT: 225 LBS | HEIGHT: 66 IN | BODY MASS INDEX: 36.16 KG/M2 | DIASTOLIC BLOOD PRESSURE: 96 MMHG | SYSTOLIC BLOOD PRESSURE: 141 MMHG

## 2023-02-23 DIAGNOSIS — I10 PRIMARY HYPERTENSION: ICD-10-CM

## 2023-02-23 DIAGNOSIS — Z98.84 S/P LAPAROSCOPIC SLEEVE GASTRECTOMY: ICD-10-CM

## 2023-02-23 DIAGNOSIS — K21.9 GASTROESOPHAGEAL REFLUX DISEASE, UNSPECIFIED WHETHER ESOPHAGITIS PRESENT: ICD-10-CM

## 2023-02-23 DIAGNOSIS — K59.00 CONSTIPATION, UNSPECIFIED CONSTIPATION TYPE: ICD-10-CM

## 2023-02-23 DIAGNOSIS — E66.9 OBESITY, CLASS II, BMI 35-39.9: Primary | ICD-10-CM

## 2023-02-23 PROCEDURE — 99213 OFFICE O/P EST LOW 20 MIN: CPT | Performed by: NURSE PRACTITIONER

## 2023-02-23 RX ORDER — SEMAGLUTIDE 0.5 MG/.5ML
0.5 INJECTION, SOLUTION SUBCUTANEOUS WEEKLY
COMMUNITY
Start: 2023-02-07 | End: 2023-02-23 | Stop reason: SDUPTHER

## 2023-02-23 RX ORDER — SEMAGLUTIDE 0.5 MG/.5ML
0.5 INJECTION, SOLUTION SUBCUTANEOUS WEEKLY
Qty: 2 ML | Refills: 2 | Status: SHIPPED | OUTPATIENT
Start: 2023-02-23 | End: 2023-03-23 | Stop reason: SDUPTHER

## 2023-02-23 RX ORDER — POLYETHYLENE GLYCOL 3350 17 G/17G
POWDER, FOR SOLUTION ORAL
COMMUNITY

## 2023-02-28 ENCOUNTER — OFFICE VISIT (OUTPATIENT)
Dept: OBSTETRICS AND GYNECOLOGY | Facility: CLINIC | Age: 48
End: 2023-02-28
Payer: COMMERCIAL

## 2023-02-28 VITALS
SYSTOLIC BLOOD PRESSURE: 126 MMHG | HEIGHT: 66 IN | DIASTOLIC BLOOD PRESSURE: 78 MMHG | HEART RATE: 74 BPM | BODY MASS INDEX: 36.48 KG/M2 | WEIGHT: 227 LBS

## 2023-02-28 DIAGNOSIS — Z12.31 ENCOUNTER FOR SCREENING MAMMOGRAM FOR MALIGNANT NEOPLASM OF BREAST: ICD-10-CM

## 2023-02-28 DIAGNOSIS — Z15.89 MONOALLELIC MUTATION OF MUTYH GENE: ICD-10-CM

## 2023-02-28 DIAGNOSIS — Z80.3 FAMILY HISTORY OF BREAST CANCER: Primary | ICD-10-CM

## 2023-02-28 DIAGNOSIS — Z12.11 SCREENING FOR COLON CANCER: ICD-10-CM

## 2023-02-28 PROCEDURE — 99213 OFFICE O/P EST LOW 20 MIN: CPT | Performed by: NURSE PRACTITIONER

## 2023-03-07 ENCOUNTER — TELEPHONE (OUTPATIENT)
Dept: OBSTETRICS AND GYNECOLOGY | Facility: CLINIC | Age: 48
End: 2023-03-07
Payer: COMMERCIAL

## 2023-03-08 DIAGNOSIS — F41.9 ANXIETY AND DEPRESSION: ICD-10-CM

## 2023-03-08 DIAGNOSIS — F32.A ANXIETY AND DEPRESSION: ICD-10-CM

## 2023-03-10 RX ORDER — BUPROPION HYDROCHLORIDE 75 MG/1
75 TABLET ORAL 2 TIMES DAILY
Qty: 60 TABLET | Refills: 3 | OUTPATIENT
Start: 2023-03-10

## 2023-03-10 RX ORDER — BUPROPION HYDROCHLORIDE 100 MG/1
100 TABLET ORAL 2 TIMES DAILY
Qty: 60 TABLET | Refills: 0 | Status: SHIPPED | OUTPATIENT
Start: 2023-03-10

## 2023-03-23 RX ORDER — SEMAGLUTIDE 0.5 MG/.5ML
0.5 INJECTION, SOLUTION SUBCUTANEOUS WEEKLY
Qty: 2 ML | Refills: 2 | Status: SHIPPED | OUTPATIENT
Start: 2023-03-23 | End: 2023-06-21

## 2023-03-29 ENCOUNTER — HOSPITAL ENCOUNTER (OUTPATIENT)
Dept: MRI IMAGING | Facility: HOSPITAL | Age: 48
Discharge: HOME | End: 2023-03-29
Admitting: NURSE PRACTITIONER
Payer: COMMERCIAL

## 2023-03-29 DIAGNOSIS — Z80.3 FAMILY HISTORY OF BREAST CANCER: ICD-10-CM

## 2023-03-29 LAB
CREAT BLDA-MCNC: 0.8 MG/DL
EGFRCR SERPLBLD CKD-EPI 2021: 91.6 ML/MIN/1.73

## 2023-03-29 PROCEDURE — 0 GADOBENATE DIMEGLUMINE 529 MG/ML SOLUTION: Performed by: NURSE PRACTITIONER

## 2023-03-29 PROCEDURE — 77049 MRI BREAST C-+ W/CAD BI: CPT

## 2023-03-29 PROCEDURE — A9577 INJ MULTIHANCE: HCPCS | Performed by: NURSE PRACTITIONER

## 2023-03-29 PROCEDURE — 82565 ASSAY OF CREATININE: CPT

## 2023-03-29 RX ADMIN — GADOBENATE DIMEGLUMINE 20 ML: 529 INJECTION, SOLUTION INTRAVENOUS at 09:12

## 2023-04-03 ENCOUNTER — TELEPHONE (OUTPATIENT)
Dept: OBSTETRICS AND GYNECOLOGY | Facility: CLINIC | Age: 48
End: 2023-04-03
Payer: COMMERCIAL

## 2023-04-04 ENCOUNTER — OFFICE VISIT (OUTPATIENT)
Dept: OBSTETRICS AND GYNECOLOGY | Facility: CLINIC | Age: 48
End: 2023-04-04
Payer: COMMERCIAL

## 2023-04-04 ENCOUNTER — TELEPHONE (OUTPATIENT)
Dept: OBSTETRICS AND GYNECOLOGY | Facility: CLINIC | Age: 48
End: 2023-04-04

## 2023-04-04 VITALS
HEIGHT: 66 IN | DIASTOLIC BLOOD PRESSURE: 74 MMHG | SYSTOLIC BLOOD PRESSURE: 118 MMHG | BODY MASS INDEX: 36 KG/M2 | WEIGHT: 224 LBS | HEART RATE: 76 BPM

## 2023-04-04 DIAGNOSIS — R92.8 ABNORMAL FINDING ON BREAST IMAGING: Primary | ICD-10-CM

## 2023-04-04 PROCEDURE — 99213 OFFICE O/P EST LOW 20 MIN: CPT | Performed by: NURSE PRACTITIONER

## 2023-04-04 RX ORDER — LANOLIN ALCOHOL/MO/W.PET/CERES
400 CREAM (GRAM) TOPICAL DAILY
COMMUNITY

## 2023-04-17 ENCOUNTER — TELEPHONE (OUTPATIENT)
Dept: OBSTETRICS AND GYNECOLOGY | Facility: CLINIC | Age: 48
End: 2023-04-17

## 2023-04-17 ENCOUNTER — TELEPHONE (OUTPATIENT)
Dept: OBSTETRICS AND GYNECOLOGY | Facility: CLINIC | Age: 48
End: 2023-04-17
Payer: COMMERCIAL

## 2023-04-17 DIAGNOSIS — R92.8 ABNORMAL MRI, BREAST: Primary | ICD-10-CM

## 2023-04-28 DIAGNOSIS — K21.9 GASTROESOPHAGEAL REFLUX DISEASE WITHOUT ESOPHAGITIS: ICD-10-CM

## 2023-04-28 RX ORDER — FAMOTIDINE 20 MG/1
TABLET, FILM COATED ORAL
Qty: 90 TABLET | Refills: 0 | Status: SHIPPED | OUTPATIENT
Start: 2023-04-28

## 2023-05-08 ENCOUNTER — OFFICE VISIT (OUTPATIENT)
Dept: SURGERY | Facility: CLINIC | Age: 48
End: 2023-05-08
Payer: COMMERCIAL

## 2023-05-08 DIAGNOSIS — Z91.89 INCREASED RISK OF BREAST CANCER: Primary | ICD-10-CM

## 2023-06-08 ENCOUNTER — OFFICE VISIT (OUTPATIENT)
Dept: BARIATRICS/WEIGHT MGMT | Facility: CLINIC | Age: 48
End: 2023-06-08
Payer: COMMERCIAL

## 2023-06-08 VITALS
HEIGHT: 66 IN | HEART RATE: 55 BPM | DIASTOLIC BLOOD PRESSURE: 77 MMHG | BODY MASS INDEX: 34.55 KG/M2 | SYSTOLIC BLOOD PRESSURE: 130 MMHG | WEIGHT: 215 LBS | TEMPERATURE: 97 F

## 2023-06-08 DIAGNOSIS — K59.09 CHRONIC CONSTIPATION: ICD-10-CM

## 2023-06-08 DIAGNOSIS — R53.82 CHRONIC FATIGUE: ICD-10-CM

## 2023-06-08 DIAGNOSIS — K21.9 GASTROESOPHAGEAL REFLUX DISEASE, UNSPECIFIED WHETHER ESOPHAGITIS PRESENT: ICD-10-CM

## 2023-06-08 DIAGNOSIS — E66.9 OBESITY, CLASS II, BMI 35-39.9: Primary | ICD-10-CM

## 2023-06-08 DIAGNOSIS — Z98.84 S/P LAPAROSCOPIC SLEEVE GASTRECTOMY: ICD-10-CM

## 2023-06-08 DIAGNOSIS — R12 HEARTBURN: ICD-10-CM

## 2023-06-08 PROCEDURE — 99213 OFFICE O/P EST LOW 20 MIN: CPT | Performed by: NURSE PRACTITIONER

## 2023-06-08 RX ORDER — SEMAGLUTIDE 1 MG/.5ML
1 INJECTION, SOLUTION SUBCUTANEOUS WEEKLY
Qty: 2 ML | Refills: 0 | Status: SHIPPED | OUTPATIENT
Start: 2023-06-08 | End: 2023-07-06

## 2023-07-24 ENCOUNTER — OFFICE VISIT (OUTPATIENT)
Dept: FAMILY MEDICINE CLINIC | Facility: CLINIC | Age: 48
End: 2023-07-24
Payer: COMMERCIAL

## 2023-07-24 VITALS
WEIGHT: 220 LBS | OXYGEN SATURATION: 100 % | TEMPERATURE: 98 F | HEART RATE: 64 BPM | DIASTOLIC BLOOD PRESSURE: 68 MMHG | SYSTOLIC BLOOD PRESSURE: 109 MMHG | BODY MASS INDEX: 36.65 KG/M2 | HEIGHT: 65 IN

## 2023-07-24 DIAGNOSIS — E03.9 HYPOTHYROIDISM, UNSPECIFIED TYPE: ICD-10-CM

## 2023-07-24 DIAGNOSIS — J30.9 ALLERGIC RHINITIS, UNSPECIFIED SEASONALITY, UNSPECIFIED TRIGGER: ICD-10-CM

## 2023-07-24 DIAGNOSIS — F41.9 ANXIETY AND DEPRESSION: ICD-10-CM

## 2023-07-24 DIAGNOSIS — K21.9 GASTROESOPHAGEAL REFLUX DISEASE WITHOUT ESOPHAGITIS: Primary | ICD-10-CM

## 2023-07-24 DIAGNOSIS — F32.A ANXIETY AND DEPRESSION: ICD-10-CM

## 2023-07-24 DIAGNOSIS — E55.9 VITAMIN D DEFICIENCY: ICD-10-CM

## 2023-07-24 DIAGNOSIS — I10 PRIMARY HYPERTENSION: ICD-10-CM

## 2023-07-24 LAB
ALBUMIN SERPL-MCNC: 4.6 G/DL (ref 3.5–5.2)
ALBUMIN/GLOB SERPL: 2.1 G/DL
ALP SERPL-CCNC: 94 U/L (ref 39–117)
ALT SERPL W P-5'-P-CCNC: 17 U/L (ref 1–33)
ANION GAP SERPL CALCULATED.3IONS-SCNC: 8 MMOL/L (ref 5–15)
AST SERPL-CCNC: 10 U/L (ref 1–32)
BASOPHILS # BLD AUTO: 0.06 10*3/MM3 (ref 0–0.2)
BASOPHILS NFR BLD AUTO: 0.8 % (ref 0–1.5)
BILIRUB SERPL-MCNC: 0.6 MG/DL (ref 0–1.2)
BUN SERPL-MCNC: 15 MG/DL (ref 6–20)
BUN/CREAT SERPL: 18.3 (ref 7–25)
CALCIUM SPEC-SCNC: 9.4 MG/DL (ref 8.6–10.5)
CHLORIDE SERPL-SCNC: 100 MMOL/L (ref 98–107)
CO2 SERPL-SCNC: 29 MMOL/L (ref 22–29)
CREAT SERPL-MCNC: 0.82 MG/DL (ref 0.57–1)
DEPRECATED RDW RBC AUTO: 40.1 FL (ref 37–54)
EGFRCR SERPLBLD CKD-EPI 2021: 88.9 ML/MIN/1.73
EOSINOPHIL # BLD AUTO: 0.18 10*3/MM3 (ref 0–0.4)
EOSINOPHIL NFR BLD AUTO: 2.3 % (ref 0.3–6.2)
ERYTHROCYTE [DISTWIDTH] IN BLOOD BY AUTOMATED COUNT: 12.3 % (ref 12.3–15.4)
GLOBULIN UR ELPH-MCNC: 2.2 GM/DL
GLUCOSE SERPL-MCNC: 72 MG/DL (ref 65–99)
HCT VFR BLD AUTO: 45.4 % (ref 34–46.6)
HGB BLD-MCNC: 14.9 G/DL (ref 12–15.9)
IMM GRANULOCYTES # BLD AUTO: 0.02 10*3/MM3 (ref 0–0.05)
IMM GRANULOCYTES NFR BLD AUTO: 0.3 % (ref 0–0.5)
LYMPHOCYTES # BLD AUTO: 2.29 10*3/MM3 (ref 0.7–3.1)
LYMPHOCYTES NFR BLD AUTO: 29.9 % (ref 19.6–45.3)
MAGNESIUM SERPL-MCNC: 2.4 MG/DL (ref 1.6–2.6)
MCH RBC QN AUTO: 29.2 PG (ref 26.6–33)
MCHC RBC AUTO-ENTMCNC: 32.8 G/DL (ref 31.5–35.7)
MCV RBC AUTO: 88.8 FL (ref 79–97)
MONOCYTES # BLD AUTO: 0.67 10*3/MM3 (ref 0.1–0.9)
MONOCYTES NFR BLD AUTO: 8.7 % (ref 5–12)
NEUTROPHILS NFR BLD AUTO: 4.45 10*3/MM3 (ref 1.7–7)
NEUTROPHILS NFR BLD AUTO: 58 % (ref 42.7–76)
NRBC BLD AUTO-RTO: 0 /100 WBC (ref 0–0.2)
PLATELET # BLD AUTO: 289 10*3/MM3 (ref 140–450)
PMV BLD AUTO: 10.4 FL (ref 6–12)
POTASSIUM SERPL-SCNC: 4.4 MMOL/L (ref 3.5–5.2)
PROT SERPL-MCNC: 6.8 G/DL (ref 6–8.5)
RBC # BLD AUTO: 5.11 10*6/MM3 (ref 3.77–5.28)
SODIUM SERPL-SCNC: 137 MMOL/L (ref 136–145)
T4 FREE SERPL-MCNC: 1.52 NG/DL (ref 0.93–1.7)
TSH SERPL DL<=0.05 MIU/L-ACNC: 2.77 UIU/ML (ref 0.27–4.2)
VIT B12 BLD-MCNC: 1819 PG/ML (ref 211–946)
WBC NRBC COR # BLD: 7.67 10*3/MM3 (ref 3.4–10.8)

## 2023-07-24 PROCEDURE — 80050 GENERAL HEALTH PANEL: CPT | Performed by: NURSE PRACTITIONER

## 2023-07-24 PROCEDURE — 82607 VITAMIN B-12: CPT | Performed by: NURSE PRACTITIONER

## 2023-07-24 PROCEDURE — 83735 ASSAY OF MAGNESIUM: CPT | Performed by: NURSE PRACTITIONER

## 2023-07-24 PROCEDURE — 99214 OFFICE O/P EST MOD 30 MIN: CPT | Performed by: NURSE PRACTITIONER

## 2023-07-24 PROCEDURE — 84439 ASSAY OF FREE THYROXINE: CPT | Performed by: NURSE PRACTITIONER

## 2023-07-24 RX ORDER — BUPROPION HYDROCHLORIDE 75 MG/1
75 TABLET ORAL 2 TIMES DAILY
Qty: 180 TABLET | Refills: 3 | Status: SHIPPED | OUTPATIENT
Start: 2023-07-24

## 2023-07-24 RX ORDER — LISINOPRIL 10 MG/1
10 TABLET ORAL DAILY
Qty: 90 TABLET | Refills: 3 | Status: SHIPPED | OUTPATIENT
Start: 2023-07-24

## 2023-07-24 RX ORDER — FAMOTIDINE 20 MG/1
20 TABLET, FILM COATED ORAL NIGHTLY
Qty: 90 TABLET | Refills: 3 | Status: SHIPPED | OUTPATIENT
Start: 2023-07-24

## 2023-07-24 RX ORDER — CETIRIZINE HYDROCHLORIDE 10 MG/1
10 TABLET ORAL DAILY PRN
Qty: 90 TABLET | Refills: 3 | Status: SHIPPED | OUTPATIENT
Start: 2023-07-24

## 2023-07-24 RX ORDER — LEVOTHYROXINE SODIUM 0.05 MG/1
50 TABLET ORAL DAILY
Qty: 90 TABLET | Refills: 3 | Status: SHIPPED | OUTPATIENT
Start: 2023-07-24

## 2023-07-24 RX ORDER — BUPROPION HYDROCHLORIDE 75 MG/1
75 TABLET ORAL 2 TIMES DAILY
COMMUNITY
End: 2023-07-24 | Stop reason: SDUPTHER

## 2023-08-17 ENCOUNTER — PREP FOR SURGERY (OUTPATIENT)
Dept: OTHER | Facility: HOSPITAL | Age: 48
End: 2023-08-17
Payer: COMMERCIAL

## 2023-08-17 ENCOUNTER — OFFICE VISIT (OUTPATIENT)
Dept: SURGERY | Facility: CLINIC | Age: 48
End: 2023-08-17
Payer: COMMERCIAL

## 2023-08-17 VITALS — WEIGHT: 225 LBS | BODY MASS INDEX: 37.49 KG/M2 | HEIGHT: 65 IN | HEART RATE: 63 BPM

## 2023-08-17 DIAGNOSIS — K21.00 GASTROESOPHAGEAL REFLUX DISEASE WITH ESOPHAGITIS WITHOUT HEMORRHAGE: Primary | ICD-10-CM

## 2023-08-17 DIAGNOSIS — R19.8 ALTERNATING CONSTIPATION AND DIARRHEA: ICD-10-CM

## 2023-08-17 DIAGNOSIS — K62.5 BRBPR (BRIGHT RED BLOOD PER RECTUM): ICD-10-CM

## 2023-08-17 DIAGNOSIS — K21.9 GASTROESOPHAGEAL REFLUX DISEASE WITHOUT ESOPHAGITIS: Primary | ICD-10-CM

## 2023-08-17 DIAGNOSIS — Z15.89 MONOALLELIC MUTATION OF MUTYH GENE: ICD-10-CM

## 2023-08-17 PROCEDURE — 99203 OFFICE O/P NEW LOW 30 MIN: CPT | Performed by: NURSE PRACTITIONER

## 2023-08-17 RX ORDER — SODIUM CHLORIDE 0.9 % (FLUSH) 0.9 %
3 SYRINGE (ML) INJECTION EVERY 12 HOURS SCHEDULED
OUTPATIENT
Start: 2023-08-17

## 2023-08-17 RX ORDER — SODIUM, POTASSIUM,MAG SULFATES 17.5-3.13G
SOLUTION, RECONSTITUTED, ORAL ORAL
Qty: 354 ML | Refills: 0 | Status: SHIPPED | OUTPATIENT
Start: 2023-08-17

## 2023-08-17 RX ORDER — SODIUM CHLORIDE 0.9 % (FLUSH) 0.9 %
10 SYRINGE (ML) INJECTION AS NEEDED
OUTPATIENT
Start: 2023-08-17

## 2023-08-17 RX ORDER — SEMAGLUTIDE 0.5 MG/.5ML
INJECTION, SOLUTION SUBCUTANEOUS
COMMUNITY
Start: 2023-08-09

## 2023-08-17 RX ORDER — SODIUM CHLORIDE 9 MG/ML
40 INJECTION, SOLUTION INTRAVENOUS AS NEEDED
OUTPATIENT
Start: 2023-08-17

## 2023-08-28 ENCOUNTER — HOSPITAL ENCOUNTER (OUTPATIENT)
Dept: MAMMOGRAPHY | Facility: HOSPITAL | Age: 48
Discharge: HOME | End: 2023-08-28
Admitting: NURSE PRACTITIONER
Payer: COMMERCIAL

## 2023-08-28 DIAGNOSIS — Z12.31 ENCOUNTER FOR SCREENING MAMMOGRAM FOR MALIGNANT NEOPLASM OF BREAST: ICD-10-CM

## 2023-08-28 PROCEDURE — 77067 SCR MAMMO BI INCL CAD: CPT

## 2023-08-28 PROCEDURE — 77063 BREAST TOMOSYNTHESIS BI: CPT

## 2023-08-28 RX ORDER — SEMAGLUTIDE 0.5 MG/.5ML
INJECTION, SOLUTION SUBCUTANEOUS
Qty: 4 ML | Refills: 0 | Status: SHIPPED | OUTPATIENT
Start: 2023-08-28

## 2023-09-05 ENCOUNTER — ANESTHESIA EVENT (OUTPATIENT)
Dept: GASTROENTEROLOGY | Facility: HOSPITAL | Age: 48
End: 2023-09-05
Payer: COMMERCIAL

## 2023-09-05 ENCOUNTER — ANESTHESIA (OUTPATIENT)
Dept: GASTROENTEROLOGY | Facility: HOSPITAL | Age: 48
End: 2023-09-05
Payer: COMMERCIAL

## 2023-09-05 ENCOUNTER — HOSPITAL ENCOUNTER (OUTPATIENT)
Facility: HOSPITAL | Age: 48
Setting detail: HOSPITAL OUTPATIENT SURGERY
Discharge: HOME | End: 2023-09-05
Attending: SURGERY | Admitting: SURGERY
Payer: COMMERCIAL

## 2023-09-05 VITALS
DIASTOLIC BLOOD PRESSURE: 79 MMHG | SYSTOLIC BLOOD PRESSURE: 110 MMHG | BODY MASS INDEX: 36.14 KG/M2 | HEART RATE: 66 BPM | HEIGHT: 66 IN | WEIGHT: 224.87 LBS | TEMPERATURE: 97 F | OXYGEN SATURATION: 96 % | RESPIRATION RATE: 16 BRPM

## 2023-09-05 DIAGNOSIS — K62.5 BRBPR (BRIGHT RED BLOOD PER RECTUM): ICD-10-CM

## 2023-09-05 DIAGNOSIS — R19.8 ALTERNATING CONSTIPATION AND DIARRHEA: ICD-10-CM

## 2023-09-05 DIAGNOSIS — K21.00 GASTROESOPHAGEAL REFLUX DISEASE WITH ESOPHAGITIS WITHOUT HEMORRHAGE: ICD-10-CM

## 2023-09-05 PROBLEM — K21.9 GERD (GASTROESOPHAGEAL REFLUX DISEASE): Status: RESOLVED | Noted: 2021-01-11 | Resolved: 2023-09-05

## 2023-09-05 LAB — B-HCG UR QL: NEGATIVE

## 2023-09-05 PROCEDURE — 25010000002 PROPOFOL 10 MG/ML EMULSION: Performed by: NURSE ANESTHETIST, CERTIFIED REGISTERED

## 2023-09-05 PROCEDURE — 81025 URINE PREGNANCY TEST: CPT | Performed by: SURGERY

## 2023-09-05 PROCEDURE — 88305 TISSUE EXAM BY PATHOLOGIST: CPT | Performed by: SURGERY

## 2023-09-05 RX ORDER — SODIUM CHLORIDE 0.9 % (FLUSH) 0.9 %
10 SYRINGE (ML) INJECTION AS NEEDED
Status: DISCONTINUED | OUTPATIENT
Start: 2023-09-05 | End: 2023-09-05 | Stop reason: HOSPADM

## 2023-09-05 RX ORDER — PROPOFOL 10 MG/ML
VIAL (ML) INTRAVENOUS AS NEEDED
Status: DISCONTINUED | OUTPATIENT
Start: 2023-09-05 | End: 2023-09-05 | Stop reason: SURG

## 2023-09-05 RX ORDER — SODIUM CHLORIDE, SODIUM LACTATE, POTASSIUM CHLORIDE, CALCIUM CHLORIDE 600; 310; 30; 20 MG/100ML; MG/100ML; MG/100ML; MG/100ML
30 INJECTION, SOLUTION INTRAVENOUS CONTINUOUS
Status: DISCONTINUED | OUTPATIENT
Start: 2023-09-05 | End: 2023-09-05 | Stop reason: HOSPADM

## 2023-09-05 RX ORDER — LIDOCAINE HYDROCHLORIDE 20 MG/ML
INJECTION, SOLUTION EPIDURAL; INFILTRATION; INTRACAUDAL; PERINEURAL AS NEEDED
Status: DISCONTINUED | OUTPATIENT
Start: 2023-09-05 | End: 2023-09-05 | Stop reason: SURG

## 2023-09-05 RX ORDER — SODIUM CHLORIDE 0.9 % (FLUSH) 0.9 %
3 SYRINGE (ML) INJECTION EVERY 12 HOURS SCHEDULED
Status: DISCONTINUED | OUTPATIENT
Start: 2023-09-05 | End: 2023-09-05 | Stop reason: HOSPADM

## 2023-09-05 RX ORDER — SODIUM CHLORIDE 9 MG/ML
40 INJECTION, SOLUTION INTRAVENOUS AS NEEDED
Status: DISCONTINUED | OUTPATIENT
Start: 2023-09-05 | End: 2023-09-05 | Stop reason: HOSPADM

## 2023-09-05 RX ADMIN — SODIUM CHLORIDE, POTASSIUM CHLORIDE, SODIUM LACTATE AND CALCIUM CHLORIDE: 600; 310; 30; 20 INJECTION, SOLUTION INTRAVENOUS at 13:29

## 2023-09-05 RX ADMIN — PROPOFOL 250 MCG/KG/MIN: 10 INJECTION, EMULSION INTRAVENOUS at 13:29

## 2023-09-05 RX ADMIN — LIDOCAINE HYDROCHLORIDE 100 MG: 20 INJECTION, SOLUTION EPIDURAL; INFILTRATION; INTRACAUDAL; PERINEURAL at 13:29

## 2023-09-05 RX ADMIN — PROPOFOL 100 MG: 10 INJECTION, EMULSION INTRAVENOUS at 13:29

## 2023-09-07 LAB
CYTO UR: NORMAL
LAB AP CASE REPORT: NORMAL
LAB AP CLINICAL INFORMATION: NORMAL
PATH REPORT.FINAL DX SPEC: NORMAL
PATH REPORT.GROSS SPEC: NORMAL

## 2023-09-25 RX ORDER — SEMAGLUTIDE 1 MG/.5ML
1 INJECTION, SOLUTION SUBCUTANEOUS WEEKLY
Qty: 2 ML | Refills: 0 | Status: SHIPPED | OUTPATIENT
Start: 2023-09-25 | End: 2023-10-23

## 2023-11-09 RX ORDER — SEMAGLUTIDE 1 MG/.5ML
1 INJECTION, SOLUTION SUBCUTANEOUS WEEKLY
Qty: 4 ML | Refills: 0 | Status: SHIPPED | OUTPATIENT
Start: 2023-11-09 | End: 2024-01-04

## 2023-11-14 DIAGNOSIS — Z76.89 ENCOUNTER FOR WEIGHT MANAGEMENT: Primary | ICD-10-CM

## 2023-11-15 ENCOUNTER — TELEMEDICINE (OUTPATIENT)
Dept: FAMILY MEDICINE CLINIC | Facility: TELEHEALTH | Age: 48
End: 2023-11-15
Payer: COMMERCIAL

## 2023-11-15 ENCOUNTER — LAB (OUTPATIENT)
Dept: LAB | Facility: HOSPITAL | Age: 48
End: 2023-11-15
Payer: COMMERCIAL

## 2023-11-15 DIAGNOSIS — J01.00 ACUTE MAXILLARY SINUSITIS, RECURRENCE NOT SPECIFIED: Primary | ICD-10-CM

## 2023-11-15 DIAGNOSIS — Z76.89 ENCOUNTER FOR WEIGHT MANAGEMENT: ICD-10-CM

## 2023-11-15 LAB
ALBUMIN SERPL-MCNC: 4.2 G/DL (ref 3.5–5.2)
ALBUMIN/GLOB SERPL: 1.6 G/DL
ALP SERPL-CCNC: 88 U/L (ref 39–117)
ALT SERPL W P-5'-P-CCNC: 12 U/L (ref 1–33)
ANION GAP SERPL CALCULATED.3IONS-SCNC: 9.7 MMOL/L (ref 5–15)
AST SERPL-CCNC: 10 U/L (ref 1–32)
BILIRUB SERPL-MCNC: 0.6 MG/DL (ref 0–1.2)
BUN SERPL-MCNC: 9 MG/DL (ref 6–20)
BUN/CREAT SERPL: 10.8 (ref 7–25)
CALCIUM SPEC-SCNC: 9.4 MG/DL (ref 8.6–10.5)
CHLORIDE SERPL-SCNC: 101 MMOL/L (ref 98–107)
CHOLEST SERPL-MCNC: 166 MG/DL (ref 0–200)
CO2 SERPL-SCNC: 27.3 MMOL/L (ref 22–29)
CREAT SERPL-MCNC: 0.83 MG/DL (ref 0.57–1)
EGFRCR SERPLBLD CKD-EPI 2021: 87.1 ML/MIN/1.73
GLOBULIN UR ELPH-MCNC: 2.7 GM/DL
GLUCOSE SERPL-MCNC: 82 MG/DL (ref 65–99)
HBA1C MFR BLD: 5 % (ref 4.8–5.6)
HDLC SERPL-MCNC: 48 MG/DL (ref 40–60)
LDLC SERPL CALC-MCNC: 101 MG/DL (ref 0–100)
LDLC/HDLC SERPL: 2.08 {RATIO}
POTASSIUM SERPL-SCNC: 4.3 MMOL/L (ref 3.5–5.2)
PROT SERPL-MCNC: 6.9 G/DL (ref 6–8.5)
SODIUM SERPL-SCNC: 138 MMOL/L (ref 136–145)
T4 FREE SERPL-MCNC: 1.42 NG/DL (ref 0.93–1.7)
TRIGL SERPL-MCNC: 90 MG/DL (ref 0–150)
TSH SERPL DL<=0.05 MIU/L-ACNC: 1.25 UIU/ML (ref 0.27–4.2)
VLDLC SERPL-MCNC: 17 MG/DL (ref 5–40)

## 2023-11-15 PROCEDURE — 83036 HEMOGLOBIN GLYCOSYLATED A1C: CPT

## 2023-11-15 PROCEDURE — 80053 COMPREHEN METABOLIC PANEL: CPT

## 2023-11-15 PROCEDURE — 84439 ASSAY OF FREE THYROXINE: CPT

## 2023-11-15 PROCEDURE — 36415 COLL VENOUS BLD VENIPUNCTURE: CPT

## 2023-11-15 PROCEDURE — 80061 LIPID PANEL: CPT

## 2023-11-15 PROCEDURE — 84443 ASSAY THYROID STIM HORMONE: CPT

## 2023-11-15 RX ORDER — AMOXICILLIN AND CLAVULANATE POTASSIUM 875; 125 MG/1; MG/1
1 TABLET, FILM COATED ORAL 2 TIMES DAILY
Qty: 20 TABLET | Refills: 0 | Status: SHIPPED | OUTPATIENT
Start: 2023-11-15 | End: 2023-11-25

## 2023-11-28 RX ORDER — SEMAGLUTIDE 1 MG/.5ML
INJECTION, SOLUTION SUBCUTANEOUS
Qty: 4 ML | Refills: 0 | Status: SHIPPED | OUTPATIENT
Start: 2023-11-28

## 2023-12-22 RX ORDER — SEMAGLUTIDE 1 MG/.5ML
INJECTION, SOLUTION SUBCUTANEOUS
Qty: 4 ML | Refills: 0 | Status: CANCELLED | OUTPATIENT
Start: 2023-12-22

## 2023-12-27 RX ORDER — SEMAGLUTIDE 1 MG/.5ML
INJECTION, SOLUTION SUBCUTANEOUS
Qty: 4 ML | Refills: 0 | Status: CANCELLED | OUTPATIENT
Start: 2023-12-27

## 2023-12-28 RX ORDER — SEMAGLUTIDE 1 MG/.5ML
INJECTION, SOLUTION SUBCUTANEOUS
Qty: 4 ML | Refills: 0 | OUTPATIENT
Start: 2023-12-28

## 2023-12-28 RX ORDER — SEMAGLUTIDE 1 MG/.5ML
1 INJECTION, SOLUTION SUBCUTANEOUS WEEKLY
Qty: 2 ML | Refills: 0 | Status: SHIPPED | OUTPATIENT
Start: 2023-12-28 | End: 2024-01-25

## 2024-01-22 RX ORDER — SEMAGLUTIDE 1 MG/.5ML
INJECTION, SOLUTION SUBCUTANEOUS
Qty: 4 ML | Refills: 0 | Status: SHIPPED | OUTPATIENT
Start: 2024-01-22 | End: 2024-01-26 | Stop reason: SDUPTHER

## 2024-01-26 ENCOUNTER — OFFICE VISIT (OUTPATIENT)
Dept: BARIATRICS/WEIGHT MGMT | Facility: CLINIC | Age: 49
End: 2024-01-26
Payer: COMMERCIAL

## 2024-01-26 VITALS
BODY MASS INDEX: 33.59 KG/M2 | DIASTOLIC BLOOD PRESSURE: 78 MMHG | SYSTOLIC BLOOD PRESSURE: 116 MMHG | HEIGHT: 66 IN | WEIGHT: 209 LBS | HEART RATE: 74 BPM | TEMPERATURE: 96.9 F

## 2024-01-26 DIAGNOSIS — I10 PRIMARY HYPERTENSION: ICD-10-CM

## 2024-01-26 DIAGNOSIS — R53.82 CHRONIC FATIGUE: ICD-10-CM

## 2024-01-26 DIAGNOSIS — Z98.84 S/P LAPAROSCOPIC SLEEVE GASTRECTOMY: ICD-10-CM

## 2024-01-26 DIAGNOSIS — E66.9 OBESITY, CLASS I, BMI 30-34.9: Primary | ICD-10-CM

## 2024-01-26 PROBLEM — E66.811 OBESITY, CLASS I, BMI 30-34.9: Status: ACTIVE | Noted: 2021-07-22

## 2024-01-26 PROBLEM — E66.812 OBESITY, CLASS II, BMI 35-39.9: Status: RESOLVED | Noted: 2021-07-22 | Resolved: 2024-01-26

## 2024-01-26 PROCEDURE — 99214 OFFICE O/P EST MOD 30 MIN: CPT | Performed by: NURSE PRACTITIONER

## 2024-01-26 RX ORDER — SEMAGLUTIDE 1 MG/.5ML
INJECTION, SOLUTION SUBCUTANEOUS
Qty: 4 ML | Refills: 0 | Status: CANCELLED | OUTPATIENT
Start: 2024-01-26

## 2024-01-26 RX ORDER — SEMAGLUTIDE 1 MG/.5ML
1 INJECTION, SOLUTION SUBCUTANEOUS WEEKLY
Qty: 6 ML | Refills: 1 | Status: SHIPPED | OUTPATIENT
Start: 2024-01-26 | End: 2024-07-06

## 2024-02-15 RX ORDER — SEMAGLUTIDE 1 MG/.5ML
1 INJECTION, SOLUTION SUBCUTANEOUS WEEKLY
Qty: 6 ML | Refills: 1 | Status: SHIPPED | OUTPATIENT
Start: 2024-02-15 | End: 2024-07-26

## 2024-05-07 DIAGNOSIS — Z13.1 DIABETES MELLITUS SCREENING: ICD-10-CM

## 2024-05-07 DIAGNOSIS — I10 PRIMARY HYPERTENSION: Primary | ICD-10-CM

## 2024-05-07 DIAGNOSIS — Z13.220 LIPID SCREENING: ICD-10-CM

## 2024-05-07 DIAGNOSIS — D50.8 IRON DEFICIENCY ANEMIA SECONDARY TO INADEQUATE DIETARY IRON INTAKE: ICD-10-CM

## 2024-05-07 DIAGNOSIS — E03.9 HYPOTHYROIDISM, UNSPECIFIED TYPE: ICD-10-CM

## 2024-05-09 ENCOUNTER — LAB (OUTPATIENT)
Dept: LAB | Facility: HOSPITAL | Age: 49
End: 2024-05-09
Payer: COMMERCIAL

## 2024-05-09 LAB
ALBUMIN SERPL-MCNC: 4.1 G/DL (ref 3.5–5.2)
ALBUMIN/GLOB SERPL: 1.5 G/DL
ALP SERPL-CCNC: 86 U/L (ref 39–117)
ALT SERPL W P-5'-P-CCNC: 11 U/L (ref 1–33)
ANION GAP SERPL CALCULATED.3IONS-SCNC: 11.6 MMOL/L (ref 5–15)
AST SERPL-CCNC: 12 U/L (ref 1–32)
BASOPHILS # BLD AUTO: 0.04 10*3/MM3 (ref 0–0.2)
BASOPHILS NFR BLD AUTO: 0.5 % (ref 0–1.5)
BILIRUB SERPL-MCNC: 0.7 MG/DL (ref 0–1.2)
BUN SERPL-MCNC: 14 MG/DL (ref 6–20)
BUN/CREAT SERPL: 15.9 (ref 7–25)
CALCIUM SPEC-SCNC: 9.1 MG/DL (ref 8.6–10.5)
CHLORIDE SERPL-SCNC: 100 MMOL/L (ref 98–107)
CHOLEST SERPL-MCNC: 172 MG/DL (ref 0–200)
CO2 SERPL-SCNC: 25.4 MMOL/L (ref 22–29)
CREAT SERPL-MCNC: 0.88 MG/DL (ref 0.57–1)
DEPRECATED RDW RBC AUTO: 40.5 FL (ref 37–54)
EGFRCR SERPLBLD CKD-EPI 2021: 81.2 ML/MIN/1.73
EOSINOPHIL # BLD AUTO: 0.12 10*3/MM3 (ref 0–0.4)
EOSINOPHIL NFR BLD AUTO: 1.5 % (ref 0.3–6.2)
ERYTHROCYTE [DISTWIDTH] IN BLOOD BY AUTOMATED COUNT: 12.5 % (ref 12.3–15.4)
GLOBULIN UR ELPH-MCNC: 2.7 GM/DL
GLUCOSE SERPL-MCNC: 74 MG/DL (ref 65–99)
HBA1C MFR BLD: 5.3 % (ref 4.8–5.6)
HCT VFR BLD AUTO: 42.9 % (ref 34–46.6)
HDLC SERPL-MCNC: 48 MG/DL (ref 40–60)
HGB BLD-MCNC: 14.2 G/DL (ref 12–15.9)
IMM GRANULOCYTES # BLD AUTO: 0.01 10*3/MM3 (ref 0–0.05)
IMM GRANULOCYTES NFR BLD AUTO: 0.1 % (ref 0–0.5)
LDLC SERPL CALC-MCNC: 111 MG/DL (ref 0–100)
LDLC/HDLC SERPL: 2.31 {RATIO}
LYMPHOCYTES # BLD AUTO: 1.77 10*3/MM3 (ref 0.7–3.1)
LYMPHOCYTES NFR BLD AUTO: 22.4 % (ref 19.6–45.3)
MAGNESIUM SERPL-MCNC: 2.3 MG/DL (ref 1.6–2.6)
MCH RBC QN AUTO: 29.5 PG (ref 26.6–33)
MCHC RBC AUTO-ENTMCNC: 33.1 G/DL (ref 31.5–35.7)
MCV RBC AUTO: 89 FL (ref 79–97)
MONOCYTES # BLD AUTO: 0.6 10*3/MM3 (ref 0.1–0.9)
MONOCYTES NFR BLD AUTO: 7.6 % (ref 5–12)
NEUTROPHILS NFR BLD AUTO: 5.35 10*3/MM3 (ref 1.7–7)
NEUTROPHILS NFR BLD AUTO: 67.9 % (ref 42.7–76)
NRBC BLD AUTO-RTO: 0 /100 WBC (ref 0–0.2)
PLATELET # BLD AUTO: 270 10*3/MM3 (ref 140–450)
PMV BLD AUTO: 10.7 FL (ref 6–12)
POTASSIUM SERPL-SCNC: 4.2 MMOL/L (ref 3.5–5.2)
PROT SERPL-MCNC: 6.8 G/DL (ref 6–8.5)
RBC # BLD AUTO: 4.82 10*6/MM3 (ref 3.77–5.28)
SODIUM SERPL-SCNC: 137 MMOL/L (ref 136–145)
TRIGL SERPL-MCNC: 65 MG/DL (ref 0–150)
TSH SERPL DL<=0.05 MIU/L-ACNC: 1.35 UIU/ML (ref 0.27–4.2)
VIT B12 BLD-MCNC: 1511 PG/ML (ref 211–946)
VLDLC SERPL-MCNC: 13 MG/DL (ref 5–40)
WBC NRBC COR # BLD AUTO: 7.89 10*3/MM3 (ref 3.4–10.8)

## 2024-05-09 PROCEDURE — 83735 ASSAY OF MAGNESIUM: CPT | Performed by: NURSE PRACTITIONER

## 2024-05-09 PROCEDURE — 82607 VITAMIN B-12: CPT | Performed by: NURSE PRACTITIONER

## 2024-05-09 PROCEDURE — 83036 HEMOGLOBIN GLYCOSYLATED A1C: CPT | Performed by: NURSE PRACTITIONER

## 2024-05-09 PROCEDURE — 80061 LIPID PANEL: CPT | Performed by: NURSE PRACTITIONER

## 2024-05-09 PROCEDURE — 80050 GENERAL HEALTH PANEL: CPT | Performed by: NURSE PRACTITIONER

## 2024-06-18 ENCOUNTER — OFFICE VISIT (OUTPATIENT)
Dept: FAMILY MEDICINE CLINIC | Facility: CLINIC | Age: 49
End: 2024-06-18
Payer: COMMERCIAL

## 2024-06-18 VITALS
HEIGHT: 66 IN | BODY MASS INDEX: 32.95 KG/M2 | DIASTOLIC BLOOD PRESSURE: 59 MMHG | TEMPERATURE: 98.2 F | SYSTOLIC BLOOD PRESSURE: 106 MMHG | HEART RATE: 69 BPM | WEIGHT: 205 LBS | OXYGEN SATURATION: 100 %

## 2024-06-18 DIAGNOSIS — Z15.89 MONOALLELIC MUTATION OF MUTYH GENE: ICD-10-CM

## 2024-06-18 DIAGNOSIS — Z15.09 BRCA GENE MUTATION TEST POSITIVE: ICD-10-CM

## 2024-06-18 DIAGNOSIS — J30.9 ALLERGIC RHINITIS, UNSPECIFIED SEASONALITY, UNSPECIFIED TRIGGER: ICD-10-CM

## 2024-06-18 DIAGNOSIS — N62 LARGE BREASTS: ICD-10-CM

## 2024-06-18 DIAGNOSIS — F41.9 ANXIETY AND DEPRESSION: ICD-10-CM

## 2024-06-18 DIAGNOSIS — K21.9 GASTROESOPHAGEAL REFLUX DISEASE WITHOUT ESOPHAGITIS: ICD-10-CM

## 2024-06-18 DIAGNOSIS — Z12.31 VISIT FOR SCREENING MAMMOGRAM: ICD-10-CM

## 2024-06-18 DIAGNOSIS — F32.A ANXIETY AND DEPRESSION: ICD-10-CM

## 2024-06-18 DIAGNOSIS — E03.9 HYPOTHYROIDISM, UNSPECIFIED TYPE: ICD-10-CM

## 2024-06-18 DIAGNOSIS — I10 PRIMARY HYPERTENSION: Primary | ICD-10-CM

## 2024-06-18 DIAGNOSIS — Z15.01 BRCA GENE MUTATION TEST POSITIVE: ICD-10-CM

## 2024-06-18 DIAGNOSIS — E55.9 VITAMIN D DEFICIENCY: ICD-10-CM

## 2024-06-18 PROCEDURE — 99214 OFFICE O/P EST MOD 30 MIN: CPT | Performed by: NURSE PRACTITIONER

## 2024-06-18 RX ORDER — LISINOPRIL 5 MG/1
5 TABLET ORAL DAILY
Qty: 90 TABLET | Refills: 3 | Status: SHIPPED | OUTPATIENT
Start: 2024-06-18

## 2024-06-18 RX ORDER — BUPROPION HYDROCHLORIDE 75 MG/1
75 TABLET ORAL 2 TIMES DAILY
Qty: 180 TABLET | Refills: 3 | Status: SHIPPED | OUTPATIENT
Start: 2024-06-18

## 2024-06-18 RX ORDER — CETIRIZINE HYDROCHLORIDE 10 MG/1
10 TABLET ORAL DAILY PRN
Qty: 90 TABLET | Refills: 3 | Status: SHIPPED | OUTPATIENT
Start: 2024-06-18

## 2024-06-18 RX ORDER — FAMOTIDINE 20 MG/1
20 TABLET, FILM COATED ORAL NIGHTLY
Qty: 90 TABLET | Refills: 3 | Status: SHIPPED | OUTPATIENT
Start: 2024-06-18

## 2024-06-18 RX ORDER — LEVOTHYROXINE SODIUM 0.05 MG/1
50 TABLET ORAL DAILY
Qty: 90 TABLET | Refills: 3 | Status: SHIPPED | OUTPATIENT
Start: 2024-06-18

## 2024-07-11 ENCOUNTER — OFFICE VISIT (OUTPATIENT)
Dept: BARIATRICS/WEIGHT MGMT | Facility: CLINIC | Age: 49
End: 2024-07-11
Payer: COMMERCIAL

## 2024-07-11 VITALS
HEART RATE: 61 BPM | TEMPERATURE: 98 F | WEIGHT: 198 LBS | HEIGHT: 66 IN | DIASTOLIC BLOOD PRESSURE: 85 MMHG | BODY MASS INDEX: 31.82 KG/M2 | SYSTOLIC BLOOD PRESSURE: 119 MMHG

## 2024-07-11 DIAGNOSIS — R12 HEARTBURN: ICD-10-CM

## 2024-07-11 DIAGNOSIS — Z98.84 S/P LAPAROSCOPIC SLEEVE GASTRECTOMY: ICD-10-CM

## 2024-07-11 DIAGNOSIS — E66.9 OBESITY, CLASS I, BMI 30-34.9: Primary | ICD-10-CM

## 2024-07-11 DIAGNOSIS — K59.09 CHRONIC CONSTIPATION: ICD-10-CM

## 2024-07-11 PROCEDURE — 99213 OFFICE O/P EST LOW 20 MIN: CPT | Performed by: NURSE PRACTITIONER

## 2024-07-11 RX ORDER — SEMAGLUTIDE 1 MG/.5ML
1 INJECTION, SOLUTION SUBCUTANEOUS WEEKLY
Qty: 6 ML | Refills: 1 | Status: SHIPPED | OUTPATIENT
Start: 2024-07-11 | End: 2024-12-20

## 2024-08-29 ENCOUNTER — HOSPITAL ENCOUNTER (OUTPATIENT)
Dept: MAMMOGRAPHY | Facility: HOSPITAL | Age: 49
Discharge: HOME | End: 2024-08-29
Admitting: NURSE PRACTITIONER
Payer: COMMERCIAL

## 2024-08-29 DIAGNOSIS — Z12.31 VISIT FOR SCREENING MAMMOGRAM: ICD-10-CM

## 2024-08-29 PROCEDURE — 77067 SCR MAMMO BI INCL CAD: CPT

## 2024-08-29 PROCEDURE — 77063 BREAST TOMOSYNTHESIS BI: CPT

## 2024-08-30 PROBLEM — R30.0 DYSURIA: Status: ACTIVE | Noted: 2024-08-30

## 2024-10-22 PROCEDURE — 87081 CULTURE SCREEN ONLY: CPT

## 2025-01-14 ENCOUNTER — TELEPHONE (OUTPATIENT)
Dept: FAMILY MEDICINE CLINIC | Facility: CLINIC | Age: 50
End: 2025-01-14
Payer: COMMERCIAL

## 2025-01-14 NOTE — TELEPHONE ENCOUNTER
Attempted to call patient, left voicemail. Patient has appointment with [facility] that needs to be rescheduled due to provider leaving the practice. Patient needs to be rescheduled with Dr. Gipson or Dr. Isaacs.    HUB OK TO READ/RELAY AND RESCHEDULE APPT

## 2025-02-04 ENCOUNTER — TELEMEDICINE (OUTPATIENT)
Dept: BARIATRICS/WEIGHT MGMT | Facility: CLINIC | Age: 50
End: 2025-02-04
Payer: COMMERCIAL

## 2025-02-04 VITALS — HEIGHT: 66 IN | WEIGHT: 200 LBS | BODY MASS INDEX: 32.14 KG/M2

## 2025-02-04 DIAGNOSIS — Z98.84 S/P LAPAROSCOPIC SLEEVE GASTRECTOMY: ICD-10-CM

## 2025-02-04 DIAGNOSIS — I10 PRIMARY HYPERTENSION: ICD-10-CM

## 2025-02-04 DIAGNOSIS — E66.811 OBESITY, CLASS I, BMI 30-34.9: Primary | ICD-10-CM

## 2025-02-04 PROCEDURE — 99214 OFFICE O/P EST MOD 30 MIN: CPT | Performed by: NURSE PRACTITIONER

## 2025-02-04 RX ORDER — SEMAGLUTIDE 1.7 MG/.75ML
1.7 INJECTION, SOLUTION SUBCUTANEOUS WEEKLY
Qty: 3 ML | Refills: 2 | Status: SHIPPED | OUTPATIENT
Start: 2025-02-04 | End: 2025-04-29

## 2025-02-04 NOTE — PROGRESS NOTES
[facility]  [address]  [facility] [address] [phone]  [address]  [facility] [address] [phone]  Dept: [phone]  2/4/2025      Pinky Chacon.  [id number]  [id number]  1975  female    Mode of Visit: Video  Location of patient: home  Location of provider: [facility] [address]  [facility] [address] [phone]   You have chosen to receive care through a telehealth visit.  Does the patient consent to use a video/audio connection for your medical care today? Yes  The visit included audio and video interaction. No technical issues occurred during this visit.     Chief Complaint   Patient presents with    Follow-up     4 year sleeve, RX       BH Post-Op Bariatric Surgery:   Pinky Chacon is status post Laparoscopic Sleeve procedure, performed on 3/9/21 who has been taking wegovy since November of 2022 and has lost and maintained over 30lb since starting.      HPI:   Today's weight is 90.7 kg (200 lb) pounds, today's BMI is Body mass index is 32.3 kg/m².,  she  has a  gain of 2 pounds since the last visit and   her   weight loss since surgery is 90 pounds. The patient reports a decreased portion size and loss of appetite.      Pinky Chacon denies nausea, vomiting, reflux, abdominal pain, dizziness and reports still dealing with mild constipation but that this has improved with time. She is currently taking mirilax daily     Diet and Exercise: Diet history reviewed and discussed with the patient. Weight loss/gains to date discussed with the patient. The patient states they are eating 60-80 grams of protein per day. She reports eating 2 solid meals per day, a protein shake or supplement in the mornings, a typical portion size of 2/3 cup, eating 1 snacks per day, drinking 6-7 or more 8-oz. glasses of water per day, no carbonated beverage consumption    Supplements: OTC MTV with iron and calcium,  daily vitamin D.     Review of Systems   Constitutional:  Positive for appetite change. Negative for fatigue and unexpected weight change.   HENT: Negative.     Eyes: Negative.    Respiratory: Negative.     Cardiovascular: Negative.  Negative for leg swelling.   Gastrointestinal:  Positive for constipation. Negative for abdominal distention, abdominal pain, diarrhea, nausea and vomiting.   Genitourinary:  Negative for difficulty urinating, frequency and urgency.   Musculoskeletal:  Negative for back pain.   Skin: Negative.    Psychiatric/Behavioral: Negative.     All other systems reviewed and are negative.      Patient Active Problem List   Diagnosis    Primary hypertension    Multiple joint pain    Anxiety    Hypothyroidism    Anemia    Chronic fatigue    Plantar fasciitis    Dietary counseling    S/P laparoscopic sleeve gastrectomy    Vitamin D deficiency    Obesity, Class I, BMI 30-34.9    Abnormal uterine bleeding (AUB)    Cyst of right ovary    Increased risk of breast cancer    Chronic constipation    Heartburn    BRCA gene mutation test positive    Monoallelic mutation of MUTYH gene    Dysuria       Past Medical History:   Diagnosis Date    Allergic rhinitis     Anemia 03/28/2014    IN PAST W HEAVY PERIODS; CURRENTLY FINE; IRON DEFICIENCY    BRBPR (bright red blood per rectum) 08/17/2023    Cholelithiasis     Removed    Cyst of right ovary 10/31/2022    Disease of thyroid gland     Edema     GERD (gastroesophageal reflux disease)     Hypertension 05/30/2017    Hypothyroidism 06/19/2018    Insulin resistance     Low back pain     Obesity, Class III, BMI 40-49.9 (morbid obesity) 01/20/2021    PONV (postoperative nausea and vomiting)     Seasonal allergies     Vitamin D deficiency 03/17/2015    Vitamin deficiency     Yeast infection of the skin     UNDER BRALINE OCCASIONALLY       The following portions of the patient's history were reviewed and updated as appropriate: allergies, current medications, past  family history, past medical history, past social history, past surgical history, and problem list.    There were no vitals filed for this visit.    Physical Exam  Vitals and nursing note reviewed.   Constitutional:       Appearance: She is well-developed. She is not diaphoretic.   Pulmonary:      Effort: Pulmonary effort is normal.   Neurological:      Mental Status: She is alert and oriented to person, place, and time.   Psychiatric:         Behavior: Behavior normal.         Assessment:   Post-op, the patient is doing well.     Encounter Diagnoses   Name Primary?    Obesity, Class I, BMI 30-34.9 Yes    S/P laparoscopic sleeve gastrectomy     Primary hypertension        Plan:   Diagnoses and all orders for this visit:    1. Obesity, Class I, BMI 30-34.9 (Primary)    2. S/P laparoscopic sleeve gastrectomy    3. Primary hypertension      Increase wegovy the 1.7mg weekly. Add a daily fiber supplement to help prevent constipation  Encouraged patient to be sure to get plenty of lean protein per day through small frequent meals all with a protein source.   Activity restrictions: none.   Recommended patient be sure to get at least 70 grams of protein per day by eating small, frequent meals all with high lean protein choices. Be sure to limit/cut back on daily carbohydrate intake. Discussed with the patient the recommended amount of water per day to intake- half of body weight in ounces. Reviewed vitamin requirements. Be sure to do routine exercise, 150 minutes per week minimum, including both cardio and strength training.     Instructions / Recommendations: dietary counseling recommended, recommended a daily protein intake of  grams, vitamin supplement(s) recommended, recommended exercising at least 150 minutes per week, behavior modifications recommended and instructed to call the office for concerns, questions, or problems.     The patient was instructed to follow up in 6 months .     The patient was counseled  regarding. Total time spent during this encounter was 35 minutes

## 2025-02-13 ENCOUNTER — TELEPHONE (OUTPATIENT)
Dept: BARIATRICS/WEIGHT MGMT | Facility: CLINIC | Age: 50
End: 2025-02-13
Payer: COMMERCIAL

## 2025-02-13 NOTE — TELEPHONE ENCOUNTER
Patient called me back.    She has completed the [employer] program.    She said that her PA isnt' due until sometime in March and when she gets home she will look at the date of when she will need one and call us a week before its due.      Explained that I sent the message in regards to her weight to provider.    Patient voiced understanding

## 2025-02-13 NOTE — TELEPHONE ENCOUNTER
Per cover my meds on the PA for Wegovy -    Your PA has been resolved, no additional PA is required.     Tried to call pharmacy to see if they can run prescription through but automated system said they were closed.    Need to know if patient completed the [facility] weight program with her ins? She has [facility]    LVM for patient to call back to ask these questions and let her know what her ins said thru cover my meds

## 2025-02-18 ENCOUNTER — HOSPITAL ENCOUNTER (OUTPATIENT)
Dept: MRI IMAGING | Facility: HOSPITAL | Age: 50
Discharge: HOME | End: 2025-02-18
Admitting: NURSE PRACTITIONER
Payer: COMMERCIAL

## 2025-02-18 DIAGNOSIS — Z15.01 BRCA GENE MUTATION TEST POSITIVE: ICD-10-CM

## 2025-02-18 DIAGNOSIS — Z15.09 BRCA GENE MUTATION TEST POSITIVE: ICD-10-CM

## 2025-02-18 PROCEDURE — 25510000002 GADOBENATE DIMEGLUMINE 529 MG/ML SOLUTION: Performed by: NURSE PRACTITIONER

## 2025-02-18 PROCEDURE — A9577 INJ MULTIHANCE: HCPCS | Performed by: NURSE PRACTITIONER

## 2025-02-18 PROCEDURE — 77049 MRI BREAST C-+ W/CAD BI: CPT

## 2025-02-18 RX ADMIN — GADOBENATE DIMEGLUMINE 20 ML: 529 INJECTION, SOLUTION INTRAVENOUS at 09:07

## 2025-02-21 ENCOUNTER — OFFICE VISIT (OUTPATIENT)
Dept: INTERNAL MEDICINE | Age: 50
End: 2025-02-21
Payer: COMMERCIAL

## 2025-02-21 VITALS
RESPIRATION RATE: 18 BRPM | TEMPERATURE: 98.8 F | OXYGEN SATURATION: 98 % | HEIGHT: 66 IN | DIASTOLIC BLOOD PRESSURE: 78 MMHG | HEART RATE: 74 BPM | BODY MASS INDEX: 31.92 KG/M2 | WEIGHT: 198.6 LBS | SYSTOLIC BLOOD PRESSURE: 130 MMHG

## 2025-02-21 DIAGNOSIS — E55.9 VITAMIN D DEFICIENCY: ICD-10-CM

## 2025-02-21 DIAGNOSIS — I10 PRIMARY HYPERTENSION: ICD-10-CM

## 2025-02-21 DIAGNOSIS — F32.A ANXIETY AND DEPRESSION: ICD-10-CM

## 2025-02-21 DIAGNOSIS — Z76.89 ENCOUNTER TO ESTABLISH CARE: Primary | ICD-10-CM

## 2025-02-21 DIAGNOSIS — G89.29 CHRONIC MIDLINE LOW BACK PAIN WITHOUT SCIATICA: ICD-10-CM

## 2025-02-21 DIAGNOSIS — K21.9 GASTROESOPHAGEAL REFLUX DISEASE WITHOUT ESOPHAGITIS: ICD-10-CM

## 2025-02-21 DIAGNOSIS — M54.50 CHRONIC MIDLINE LOW BACK PAIN WITHOUT SCIATICA: ICD-10-CM

## 2025-02-21 DIAGNOSIS — E03.9 HYPOTHYROIDISM, UNSPECIFIED TYPE: ICD-10-CM

## 2025-02-21 DIAGNOSIS — J30.9 ALLERGIC RHINITIS, UNSPECIFIED SEASONALITY, UNSPECIFIED TRIGGER: ICD-10-CM

## 2025-02-21 DIAGNOSIS — Z98.84 S/P LAPAROSCOPIC SLEEVE GASTRECTOMY: ICD-10-CM

## 2025-02-21 DIAGNOSIS — G25.0 ESSENTIAL TREMOR: ICD-10-CM

## 2025-02-21 DIAGNOSIS — F41.9 ANXIETY AND DEPRESSION: ICD-10-CM

## 2025-02-21 DIAGNOSIS — Z15.89 MONOALLELIC MUTATION OF MUTYH GENE: ICD-10-CM

## 2025-02-21 DIAGNOSIS — D17.9 MULTIPLE LIPOMAS: ICD-10-CM

## 2025-02-21 RX ORDER — BUPROPION HYDROCHLORIDE 75 MG/1
75 TABLET ORAL DAILY
Qty: 30 TABLET | Refills: 5 | Status: SHIPPED | OUTPATIENT
Start: 2025-02-21

## 2025-02-21 RX ORDER — TRETINOIN 1 MG/G
1 CREAM TOPICAL NIGHTLY
COMMUNITY
Start: 2024-12-18

## 2025-02-21 RX ORDER — FAMOTIDINE 20 MG/1
20 TABLET, FILM COATED ORAL NIGHTLY
Qty: 90 TABLET | Refills: 3 | Status: SHIPPED | OUTPATIENT
Start: 2025-02-21

## 2025-02-21 RX ORDER — CETIRIZINE HYDROCHLORIDE 10 MG/1
10 TABLET ORAL DAILY PRN
Qty: 90 TABLET | Refills: 3 | Status: SHIPPED | OUTPATIENT
Start: 2025-02-21

## 2025-02-21 RX ORDER — LISINOPRIL 5 MG/1
5 TABLET ORAL DAILY
Qty: 90 TABLET | Refills: 3 | Status: SHIPPED | OUTPATIENT
Start: 2025-02-21

## 2025-02-21 RX ORDER — LEVOTHYROXINE SODIUM 50 UG/1
50 TABLET ORAL DAILY
Qty: 90 TABLET | Refills: 3 | Status: SHIPPED | OUTPATIENT
Start: 2025-02-21

## 2025-02-21 NOTE — PROGRESS NOTES
Chief Complaint  Establish Care (49 year old female here today to establish care. She is transitioning from [employer]. She complains of having tremors on occasion. States she her family and friends have noticed it for a few months and she has noticed it more recently. )    History of Present Illness  SUBJECTIVE  Pinky Chacon presents to [facility] to establish care.  History of Present Illness  The patient presents for evaluation of anxiety, head tremor, hypertension, hypothyroidism, lymphomas in the neck, and health maintenance.    She has been on Wellbutrin 75 mg, non-extended release, for approximately 2 years, which was initiated concurrently with her weight loss journey. The medication was prescribed primarily for anxiety, with a secondary indication for depression. She believes that her mental health has improved significantly following her weight loss and extensive therapy. She attributes some of her previous symptoms to hypothyroidism. Despite her desire to discontinue Wellbutrin, she is hesitant due to her current stable condition.    She has observed a slight head tremor. She has been informed that both Wellbutrin and levothyroxine can potentially cause tremors. She first noticed the tremor while looking in the mirror and confirmed its presence with a friend. The tremor is more pronounced when she is looking down or focusing her eyes. She has eliminated caffeine from her diet but continues to consume Zipfizz, which contains green tea caffeine, and occasionally drinks Coke Zero. She suspects that the tremor may be related to her caffeine intake or her medications. She also has lipomas in her neck, which she believes have increased in size.    She has a history of hypertension, which is more severe during the summer months. However, her blood pressure has remained stable since her recent weight loss. She is currently on lisinopril 5 mg daily, a reduction from her  previous dose of 20 mg.    She has a history of hypothyroidism. Her Synthroid dosage is 50 mcg daily.    She has a history of lipomas in her neck, which she believes have increased in size. She occasionally experiences tenderness in these areas, which she attributes to her breast size. She also reports severe pressure pain in her lumbar region, which she initially thought was related to her tailbone. Physical therapy revealed that the pain extends further up her lumbar spine. She wonders if the lymphomas are contributing to this pain. She does not experience any movement issues or pain, other than touch-based tenderness. She occasionally experiences neck tenderness and nerve-based pinching.    She has a history of high blood pressure and underactive thyroid. She underwent gastric sleeve surgery 4 years ago, resulting in a weight loss of 100 pounds, which she has maintained. She experienced a plateau in her weight loss, losing about 70 pounds post-surgery, and subsequently started Wegovy, which she has been on for about 2 years. She has a positive BRCA gene and a single MUTYH gene mutation. She had a colonoscopy on 07/23/2023 and was advised to repeat it in 5 years, but she is considering a 3-year interval. She had a Pap smear in 2022 and is due for another one soon. She occasionally experiences spotting post-ablation and has been advised to consider a hysterectomy if the spotting increases. She typically does not receive the influenza vaccine. She has completed the initial COVID-19 vaccine series but has not received the booster. She is not up to date on her tetanus vaccine. 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 She had blood work done in May 2023. She requested B12 and magnesium tests due to elevated B12 levels and extreme constipation caused by Wegovy. She takes magnesium and MiraLAX daily, as well as vitamin D. She had scheduled plastic surgery for 05/15/2024 but  canceled it due to insufficient tissue for insurance coverage. She plans to lose more weight before rescheduling the surgery.    Supplemental Information  She takes Pepcid for acid reflux.    SOCIAL HISTORY  She does not smoke. She drinks alcohol occasionally. 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    FAMILY HISTORY  Her mother had essential tremors.    MEDICATIONS  Current: Wellbutrin, Pepcid, levothyroxine, lisinopril, magnesium, MiraLAX, vitamin D, Wegovy.    IMMUNIZATIONS  She has had the initial series of the COVID-19 vaccine but does not have the booster. She usually does not get the influenza vaccine. She is not up to date on her tetanus vaccine.    Mammogram-2/2025-annual MRI's  Monthly SBE counseled  PAP-due this year  Colonoscopy-9/2023-q5 years.   Flu vaccine-Declines  COVID-19 vaccine-initial series, no boosters  Lipids-UTD  TDAP-declines  Denies tobacco use/occasional alcohol use  Recommend regular dental/eye exams, regular exercise, healthy diet.    Past Medical History:   Diagnosis Date    Allergic rhinitis     Anemia 03/28/2014    IN PAST W HEAVY PERIODS; CURRENTLY FINE; IRON DEFICIENCY    Anxiety and depression 1/11/2021    BRBPR (bright red blood per rectum) 08/17/2023    Cholelithiasis     Removed    Cyst of right ovary 10/31/2022    Disease of thyroid gland     Edema     GERD (gastroesophageal reflux disease)     Hypertension 05/30/2017    Hypothyroidism 06/19/2018    Insulin resistance     Low back pain     Obesity, Class III, BMI 40-49.9 (morbid obesity) 01/20/2021    PONV (postoperative nausea and vomiting)     Seasonal allergies     Vitamin D deficiency 03/17/2015    Vitamin deficiency     Yeast infection of the skin     UNDER BRALINE OCCASIONALLY      Family History   Problem Relation Age of Onset    Obesity Father     Diabetes Father         PREDIABETIC    Hypertension Father     Heart disease Father     Hyperlipidemia Father     Asthma Father      COPD Father     Hypertension Mother     Cancer Mother         PANCREATIC    Heart disease Mother     Thyroid disease Mother     Hyperlipidemia Mother     Heart attack Paternal Grandfather     Heart disease Paternal Grandfather     Hypertension Paternal Grandfather     Hypertension Paternal Grandmother     Heart attack Paternal Grandmother     Heart disease Paternal Grandmother     Hyperlipidemia Paternal Grandmother     Obesity Maternal Grandmother     Cancer Maternal Grandmother         LEUKEMIA    Heart disease Maternal Grandmother     Hypertension Maternal Grandmother     Heart disease Maternal Grandfather     COPD Maternal Grandfather     Hypertension Maternal Grandfather     Breast cancer Paternal Aunt 40    Hyperlipidemia Other     Hypertension Other     Heart disease Other     Leukemia Other     Malig Hyperthermia Neg Hx     Ovarian cancer Neg Hx     Uterine cancer Neg Hx     Colon cancer Neg Hx     Melanoma Neg Hx     Prostate cancer Neg Hx       Past Surgical History:   Procedure Laterality Date    BARIATRIC SURGERY      COLONOSCOPY N/A 09/05/2023    Procedure: COLONOSCOPY;  Surgeon: Yo Parker MD;  Location: formerly [facility] ENDOSCOPY;  Service: General;  Laterality: N/A;  DIVERTICULOSIS    ENDOMETRIAL ABLATION  2017    ENDOSCOPY N/A 03/02/2021    Procedure: ESOPHAGOGASTRODUODENOSCOPY WITH BIOPSY;  Surgeon: Jason Day Jr., MD;  Location: [facility] ENDOSCOPY;  Service: General;  Laterality: N/A;  PRE- GERD  POST-GASTRITIS    ENDOSCOPY N/A 09/05/2023    Procedure: ESOPHAGOGASTRODUODENOSCOPY WITH BIOPSIES;  Surgeon: Yo Parker MD;  Location: [facility] ENDOSCOPY;  Service: General;  Laterality: N/A;  MILD GASTRITIS    GASTRIC SLEEVE LAPAROSCOPIC N/A 03/29/2021    Procedure: GASTRIC SLEEVE LAPAROSCOPIC;  Surgeon: Jason Day Jr., MD;  [place] [facility] [place] [facility] [place]  Service: Bariatric;  Laterality: N/A;    LAPAROSCOPIC CHOLECYSTECTOMY  2013    WISDOM TOOTH EXTRACTION  1993        Current Outpatient  "Medications:     buPROPion (WELLBUTRIN) 75 MG tablet, Take 1 tablet by mouth Daily., Disp: 30 tablet, Rfl: 5    cetirizine (zyrTEC) 10 MG tablet, Take 1 tablet by mouth Daily As Needed (as needed)., Disp: 90 tablet, Rfl: 3    famotidine (PEPCID) 20 MG tablet, Take 1 tablet by mouth Every Night., Disp: 90 tablet, Rfl: 3    fluticasone (FLONASE) 50 MCG/ACT nasal spray, USE 1 SPRAY(S) IN EACH NOSTRIL TWICE DAILY UNTIL DIRECTED TO STOP., Disp: , Rfl:     levothyroxine (SYNTHROID, LEVOTHROID) 50 MCG tablet, Take 1 tablet by mouth Daily., Disp: 90 tablet, Rfl: 3    lisinopril (PRINIVIL,ZESTRIL) 5 MG tablet, Take 1 tablet by mouth Daily., Disp: 90 tablet, Rfl: 3    Magnesium Oxide 400 (240 Mg) MG tablet, Take 1 tablet by mouth Daily., Disp: , Rfl:     tretinoin (RETIN-A) 0.1 % cream, Apply 1 Application topically to the appropriate area as directed Every Night. With Hyaluronic Acid added, Disp: , Rfl:     vitamin D3 125 MCG (5000 UT) capsule capsule, Take 1 capsule by mouth Daily., Disp: 90 capsule, Rfl: 3    Wegovy 1.7 MG/0.75ML solution auto-injector, Inject 0.75 mL under the skin into the appropriate area as directed 1 (One) Time Per Week for 84 days., Disp: 3 mL, Rfl: 2    OBJECTIVE  Vital Signs:   /78 (BP Location: Left arm, Patient Position: Sitting)   Pulse 74   Temp 98.8 °F (37.1 °C) (Temporal)   Resp 18   Ht 167.6 cm (65.98\")   Wt 90.1 kg (198 lb 9.6 oz)   SpO2 98%   BMI 32.07 kg/m²    Estimated body mass index is 32.07 kg/m² as calculated from the following:    Height as of this encounter: 167.6 cm (65.98\").    Weight as of this encounter: 90.1 kg (198 lb 9.6 oz).     Wt Readings from Last 3 Encounters:   02/21/25 90.1 kg (198 lb 9.6 oz)   02/04/25 90.7 kg (200 lb)   10/22/24 88.1 kg (194 lb 3.2 oz)     BP Readings from Last 3 Encounters:   02/21/25 130/78   10/22/24 118/67   08/30/24 130/69       Physical Exam  Vitals and nursing note reviewed.   Constitutional:       Appearance: Normal appearance. "   HENT:      Head: Normocephalic.   Eyes:      Extraocular Movements: Extraocular movements intact.      Conjunctiva/sclera: Conjunctivae normal.   Neck:        Comments: 2 palpable masses-soft/spongy like  Cardiovascular:      Rate and Rhythm: Normal rate and regular rhythm.      Heart sounds: Normal heart sounds. No murmur heard.  Pulmonary:      Effort: Pulmonary effort is normal.      Breath sounds: Normal breath sounds. No wheezing or rales.   Abdominal:      General: Bowel sounds are normal.      Palpations: Abdomen is soft.      Tenderness: There is no abdominal tenderness. There is no guarding.   Musculoskeletal:         General: No swelling. Normal range of motion.   Skin:     General: Skin is warm and dry.   Neurological:      General: No focal deficit present.      Mental Status: She is alert. Mental status is at baseline.   Psychiatric:         Mood and Affect: Mood normal.         Thought Content: Thought content normal.          Result Review        MRI Breast Bilateral Screening With & Without Contrast    Result Date: 2/18/2025  1.  No MR evidence of primary breast malignancy. 2.  Continued annual high risk screening breast MRI is recommended at alternating 6-month intervals with annual screening mammography.  ASSESSMENT: BI-RADS 2. Benign findings.      2/18/2025 12:40 PM by Izaiah Nunez on Workstation: [employer]         The above data has been reviewed by BUDDY Garcia 02/21/2025 09:19 EST.          Patient Care Team:  Helga Day APRN as PCP - General (Internal Medicine)  Jason Day Jr., MD as Surgeon (Bariatrics)            ASSESSMENT & PLAN    Diagnoses and all orders for this visit:    1. Encounter to establish care (Primary)  -     Vitamin B12; Future  -     Folate; Future  -     Lipid panel; Future    2. Anxiety and depression  Comments:  Continue Wellbutrin 75 mg twice a day.  Orders:  -     buPROPion (WELLBUTRIN) 75 MG tablet; Take 1 tablet by mouth Daily.  Dispense: 30  tablet; Refill: 5    3. Hypothyroidism, unspecified type  Comments:  Continue levothyroxine 50 mcg  Orders:  -     Cancel: CBC w AUTO Differential  -     Cancel: Comprehensive metabolic panel  -     Cancel: Lipid panel  -     Cancel: TSH+Free T4  -     Cancel: Vitamin B12  -     Cancel: Folate  -     Cancel: Magnesium  -     levothyroxine (SYNTHROID, LEVOTHROID) 50 MCG tablet; Take 1 tablet by mouth Daily.  Dispense: 90 tablet; Refill: 3  -     Urinalysis With Microscopic - Urine, Clean Catch  -     TSH+Free T4; Future    4. Primary hypertension  Comments:  Decrease lisinopril to 5 mg once a day.  Assessment & Plan:  Very well controlled  Continue with lisinopril 5 mg daily.     Orders:  -     Cancel: CBC w AUTO Differential  -     Cancel: Comprehensive metabolic panel  -     Cancel: Lipid panel  -     Cancel: TSH+Free T4  -     Cancel: Vitamin B12  -     Cancel: Folate  -     Cancel: Magnesium  -     lisinopril (PRINIVIL,ZESTRIL) 5 MG tablet; Take 1 tablet by mouth Daily.  Dispense: 90 tablet; Refill: 3  -     Urinalysis With Microscopic - Urine, Clean Catch  -     Comprehensive metabolic panel; Future  -     Magnesium; Future  -     CBC w AUTO Differential; Future    5. Vitamin D deficiency  Comments:  Continue vitamin D3  Orders:  -     Cancel: Vitamin D 25 hydroxy  -     vitamin D3 125 MCG (5000 UT) capsule capsule; Take 1 capsule by mouth Daily.  Dispense: 90 capsule; Refill: 3  -     Vitamin D 25 hydroxy; Future    6. S/P laparoscopic sleeve gastrectomy  Overview:  Patient has lost 100 lbs-she is on wegovy 1.7 mg weekly and is doing well.       7. Monoallelic mutation of MUTYH gene    8. Multiple lipomas  -     CT Soft Tissue Neck With & Without Contrast; Future  -     CT Lumbar Spine With & Without Contrast; Future    9. Essential tremor  -     CT Head Without Contrast; Future    10. Allergic rhinitis, unspecified seasonality, unspecified trigger  Comments:  She will continue Zyrtec 10 mg as  needed.  Orders:  -     cetirizine (zyrTEC) 10 MG tablet; Take 1 tablet by mouth Daily As Needed (as needed).  Dispense: 90 tablet; Refill: 3    11. Gastroesophageal reflux disease without esophagitis  Comments:  Continue Pepcid as needed  Orders:  -     famotidine (PEPCID) 20 MG tablet; Take 1 tablet by mouth Every Night.  Dispense: 90 tablet; Refill: 3    12. Chronic midline low back pain without sciatica  -     CT Lumbar Spine With & Without Contrast; Future         Tobacco Use: Low Risk  (2/21/2025)    Patient History     Smoking Tobacco Use: Never     Smokeless Tobacco Use: Never     Passive Exposure: Never       Assessment & Plan  1. Anxiety.  She has been on Wellbutrin 75 mg once daily, which is not extended release. She believes her anxiety has improved with weight loss and therapy. She will taper off Wellbutrin by reducing the dosage to every other day for the next month, then gradually discontinuing it.    2. Head tremor.  She has noticed a head tremor, which may be related to her medications (Wellbutrin or levothyroxine) or caffeine intake. A CT scan of the head and soft tissue of the neck will be ordered to rule out any underlying conditions.    3. Hypertension.  She is currently on lisinopril 5 mg daily. Her blood pressure has been stable, especially after weight loss. She will continue with the current dosage of lisinopril.    4. Hypothyroidism.  She is on levothyroxine 50 mcg daily. Her thyroid levels have been stable, and no recent adjustments have been made. She will continue with the current dosage of levothyroxine.    5. Lymphomas in the neck.  She has noticed some lymphomas in her neck, which occasionally cause tenderness. A CT scan of the soft tissue of the neck will be ordered. If the lymphomas are causing functional issues, a referral to general surgery will be considered.    6. Health maintenance.  She had a colonoscopy in July 2023 and is due for another in 5 years. She prefers to have it  done in 3 years. She had a Pap smear in 2022 and is due for another soon. She has completed the initial COVID-19 vaccine series but has not received the booster. She usually does not get the influenza vaccine. She is not up to date on her tetanus vaccine. Annual labs, including CBC, B12, magnesium, and vitamin D, will be done today. A fecal occult blood test will be considered instead of a colonoscopy. She will follow up with her gynecologist for the Pap smear.    Follow-up  The patient will follow up in 4 months.    PROCEDURE  The patient underwent gastric sleeve surgery 4 years ago, resulting in a weight loss of 100 pounds, which she has maintained. She had a colonoscopy on 07/23/2023. The patient has a history of ablation and occasionally experiences spotting.      Follow Up     Return in about 4 months (around 6/21/2025) for Annual physical.    Please note that portions of this note were completed with a voice recognition program.    Patient was given instructions and counseling regarding her condition or for health maintenance advice. Please see specific information pulled into the AVS if appropriate.   I have reviewed information obtained and documented by others and I have confirmed the accuracy of this documented note.    BUDDY Garcia    Patient or patient representative verbalized consent for the use of Ambient Listening during the visit with  BUDDY Garcia for chart documentation. 2/21/2025  12:39 EST

## 2025-03-07 ENCOUNTER — HOSPITAL ENCOUNTER (OUTPATIENT)
Dept: CT IMAGING | Facility: HOSPITAL | Age: 50
Discharge: HOME | End: 2025-03-07
Payer: COMMERCIAL

## 2025-03-07 DIAGNOSIS — G25.0 ESSENTIAL TREMOR: ICD-10-CM

## 2025-03-07 DIAGNOSIS — M54.50 CHRONIC MIDLINE LOW BACK PAIN WITHOUT SCIATICA: ICD-10-CM

## 2025-03-07 DIAGNOSIS — G89.29 CHRONIC MIDLINE LOW BACK PAIN WITHOUT SCIATICA: ICD-10-CM

## 2025-03-07 DIAGNOSIS — D17.9 MULTIPLE LIPOMAS: ICD-10-CM

## 2025-03-07 PROCEDURE — 72131 CT LUMBAR SPINE W/O DYE: CPT

## 2025-03-07 PROCEDURE — 70450 CT HEAD/BRAIN W/O DYE: CPT

## 2025-03-07 PROCEDURE — 25510000001 IOPAMIDOL PER 1 ML

## 2025-03-07 PROCEDURE — 70491 CT SOFT TISSUE NECK W/DYE: CPT

## 2025-03-07 RX ORDER — IOPAMIDOL 755 MG/ML
100 INJECTION, SOLUTION INTRAVASCULAR
Status: COMPLETED | OUTPATIENT
Start: 2025-03-07 | End: 2025-03-07

## 2025-03-07 RX ADMIN — IOPAMIDOL 100 ML: 755 INJECTION, SOLUTION INTRAVENOUS at 14:37

## 2025-03-17 ENCOUNTER — LAB (OUTPATIENT)
Dept: LAB | Facility: HOSPITAL | Age: 50
End: 2025-03-17
Payer: COMMERCIAL

## 2025-03-17 DIAGNOSIS — E03.9 HYPOTHYROIDISM, UNSPECIFIED TYPE: ICD-10-CM

## 2025-03-17 DIAGNOSIS — Z76.89 ENCOUNTER TO ESTABLISH CARE: ICD-10-CM

## 2025-03-17 DIAGNOSIS — E55.9 VITAMIN D DEFICIENCY: ICD-10-CM

## 2025-03-17 DIAGNOSIS — I10 PRIMARY HYPERTENSION: ICD-10-CM

## 2025-03-17 LAB
25(OH)D3 SERPL-MCNC: 61.7 NG/ML (ref 30–100)
ALBUMIN SERPL-MCNC: 4.1 G/DL (ref 3.5–5.2)
ALBUMIN/GLOB SERPL: 1.4 G/DL
ALP SERPL-CCNC: 79 U/L (ref 39–117)
ALT SERPL W P-5'-P-CCNC: 26 U/L (ref 1–33)
AMORPH URATE CRY URNS QL MICRO: ABNORMAL /HPF
ANION GAP SERPL CALCULATED.3IONS-SCNC: 6.1 MMOL/L (ref 5–15)
AST SERPL-CCNC: 11 U/L (ref 1–32)
BACTERIA UR QL AUTO: ABNORMAL /HPF
BASOPHILS # BLD AUTO: 0.07 10*3/MM3 (ref 0–0.2)
BASOPHILS NFR BLD AUTO: 1 % (ref 0–1.5)
BILIRUB SERPL-MCNC: 0.7 MG/DL (ref 0–1.2)
BILIRUB UR QL STRIP: NEGATIVE
BUN SERPL-MCNC: 16 MG/DL (ref 6–20)
BUN/CREAT SERPL: 18.4 (ref 7–25)
CALCIUM SPEC-SCNC: 9.4 MG/DL (ref 8.6–10.5)
CHLORIDE SERPL-SCNC: 101 MMOL/L (ref 98–107)
CHOLEST SERPL-MCNC: 194 MG/DL (ref 0–200)
CLARITY UR: ABNORMAL
CO2 SERPL-SCNC: 30.9 MMOL/L (ref 22–29)
COLOR UR: YELLOW
CREAT SERPL-MCNC: 0.87 MG/DL (ref 0.57–1)
DEPRECATED RDW RBC AUTO: 40.4 FL (ref 37–54)
EGFRCR SERPLBLD CKD-EPI 2021: 81.8 ML/MIN/1.73
EOSINOPHIL # BLD AUTO: 0.11 10*3/MM3 (ref 0–0.4)
EOSINOPHIL NFR BLD AUTO: 1.6 % (ref 0.3–6.2)
ERYTHROCYTE [DISTWIDTH] IN BLOOD BY AUTOMATED COUNT: 12.3 % (ref 12.3–15.4)
FOLATE SERPL-MCNC: 8.01 NG/ML (ref 4.78–24.2)
GLOBULIN UR ELPH-MCNC: 3 GM/DL
GLUCOSE SERPL-MCNC: 85 MG/DL (ref 65–99)
GLUCOSE UR STRIP-MCNC: NEGATIVE MG/DL
HCT VFR BLD AUTO: 42.1 % (ref 34–46.6)
HDLC SERPL-MCNC: 54 MG/DL (ref 40–60)
HGB BLD-MCNC: 14.2 G/DL (ref 12–15.9)
HGB UR QL STRIP.AUTO: NEGATIVE
HYALINE CASTS UR QL AUTO: ABNORMAL /LPF
IMM GRANULOCYTES # BLD AUTO: 0.02 10*3/MM3 (ref 0–0.05)
IMM GRANULOCYTES NFR BLD AUTO: 0.3 % (ref 0–0.5)
KETONES UR QL STRIP: NEGATIVE
LDLC SERPL CALC-MCNC: 126 MG/DL (ref 0–100)
LDLC/HDLC SERPL: 2.31 {RATIO}
LEUKOCYTE ESTERASE UR QL STRIP.AUTO: NEGATIVE
LYMPHOCYTES # BLD AUTO: 1.84 10*3/MM3 (ref 0.7–3.1)
LYMPHOCYTES NFR BLD AUTO: 27.3 % (ref 19.6–45.3)
MAGNESIUM SERPL-MCNC: 2.3 MG/DL (ref 1.6–2.6)
MCH RBC QN AUTO: 30.1 PG (ref 26.6–33)
MCHC RBC AUTO-ENTMCNC: 33.7 G/DL (ref 31.5–35.7)
MCV RBC AUTO: 89.2 FL (ref 79–97)
MONOCYTES # BLD AUTO: 0.55 10*3/MM3 (ref 0.1–0.9)
MONOCYTES NFR BLD AUTO: 8.2 % (ref 5–12)
NEUTROPHILS NFR BLD AUTO: 4.15 10*3/MM3 (ref 1.7–7)
NEUTROPHILS NFR BLD AUTO: 61.6 % (ref 42.7–76)
NITRITE UR QL STRIP: NEGATIVE
NRBC BLD AUTO-RTO: 0 /100 WBC (ref 0–0.2)
PH UR STRIP.AUTO: 8.5 [PH] (ref 5–8)
PLATELET # BLD AUTO: 301 10*3/MM3 (ref 140–450)
PMV BLD AUTO: 10.2 FL (ref 6–12)
POTASSIUM SERPL-SCNC: 4.5 MMOL/L (ref 3.5–5.2)
PROT SERPL-MCNC: 7.1 G/DL (ref 6–8.5)
PROT UR QL STRIP: NEGATIVE
RBC # BLD AUTO: 4.72 10*6/MM3 (ref 3.77–5.28)
RBC # UR STRIP: ABNORMAL /HPF
REF LAB TEST METHOD: ABNORMAL
SODIUM SERPL-SCNC: 138 MMOL/L (ref 136–145)
SP GR UR STRIP: 1.02 (ref 1–1.03)
SQUAMOUS #/AREA URNS HPF: ABNORMAL /HPF
T4 FREE SERPL-MCNC: 1.54 NG/DL (ref 0.92–1.68)
TRIGL SERPL-MCNC: 77 MG/DL (ref 0–150)
TSH SERPL DL<=0.05 MIU/L-ACNC: 2.15 UIU/ML (ref 0.27–4.2)
UROBILINOGEN UR QL STRIP: ABNORMAL
VIT B12 BLD-MCNC: 1638 PG/ML (ref 211–946)
VLDLC SERPL-MCNC: 14 MG/DL (ref 5–40)
WBC # UR STRIP: ABNORMAL /HPF
WBC NRBC COR # BLD AUTO: 6.74 10*3/MM3 (ref 3.4–10.8)

## 2025-03-17 PROCEDURE — 80050 GENERAL HEALTH PANEL: CPT

## 2025-03-17 PROCEDURE — 84439 ASSAY OF FREE THYROXINE: CPT

## 2025-03-17 PROCEDURE — 81001 URINALYSIS AUTO W/SCOPE: CPT

## 2025-03-17 PROCEDURE — 36415 COLL VENOUS BLD VENIPUNCTURE: CPT

## 2025-03-17 PROCEDURE — 82306 VITAMIN D 25 HYDROXY: CPT

## 2025-03-17 PROCEDURE — 83735 ASSAY OF MAGNESIUM: CPT

## 2025-03-17 PROCEDURE — 82746 ASSAY OF FOLIC ACID SERUM: CPT

## 2025-03-17 PROCEDURE — 80061 LIPID PANEL: CPT

## 2025-03-17 PROCEDURE — 82607 VITAMIN B-12: CPT

## 2025-03-18 ENCOUNTER — RESULTS FOLLOW-UP (OUTPATIENT)
Dept: INTERNAL MEDICINE | Age: 50
End: 2025-03-18
Payer: COMMERCIAL

## 2025-03-18 DIAGNOSIS — I10 PRIMARY HYPERTENSION: ICD-10-CM

## 2025-03-18 DIAGNOSIS — E03.9 HYPOTHYROIDISM, UNSPECIFIED TYPE: Primary | ICD-10-CM

## 2025-03-18 NOTE — TELEPHONE ENCOUNTER
Hub may relay message to patient.     Please call patient with lab results.  Vitamin B12, CMP, CBC, vitamin D, folate, thyroid function, magnesium are all stable.  Her bad cholesterol is elevated.  I do recommend continued healthier diet, increased activity and weight loss.  Note      There is a trace of bacteria in her urine.  This could be contamination.  If she has any symptoms please let me know.  Thank you

## 2025-03-18 NOTE — PROGRESS NOTES
There is a trace of bacteria in her urine.  This could be contamination.  If she has any symptoms please let me know.  Thank you

## 2025-03-21 ENCOUNTER — TRANSCRIBE ORDERS (OUTPATIENT)
Dept: ADMINISTRATIVE | Facility: HOSPITAL | Age: 50
End: 2025-03-21
Payer: COMMERCIAL

## 2025-03-21 ENCOUNTER — HOSPITAL ENCOUNTER (OUTPATIENT)
Dept: CT IMAGING | Facility: HOSPITAL | Age: 50
Discharge: HOME | End: 2025-03-21
Payer: COMMERCIAL

## 2025-03-21 DIAGNOSIS — M54.50 CHRONIC MIDLINE LOW BACK PAIN WITHOUT SCIATICA: ICD-10-CM

## 2025-03-21 DIAGNOSIS — G89.29 CHRONIC MIDLINE LOW BACK PAIN WITHOUT SCIATICA: ICD-10-CM

## 2025-03-21 DIAGNOSIS — D17.9 MULTIPLE LIPOMAS: ICD-10-CM

## 2025-03-21 DIAGNOSIS — D17.9 MULTIPLE LIPOMAS: Primary | ICD-10-CM

## 2025-04-25 RX ORDER — SEMAGLUTIDE 1.7 MG/.75ML
INJECTION, SOLUTION SUBCUTANEOUS WEEKLY
Qty: 4 ML | Refills: 0 | Status: SHIPPED | OUTPATIENT
Start: 2025-04-25

## 2025-05-22 RX ORDER — SEMAGLUTIDE 1.7 MG/.75ML
1.7 INJECTION, SOLUTION SUBCUTANEOUS WEEKLY
Qty: 4 ML | Refills: 0 | Status: SHIPPED | OUTPATIENT
Start: 2025-05-22

## 2025-05-22 NOTE — TELEPHONE ENCOUNTER
Please advise. Patient is requesting enough refills to get her to her 8/1/25 office visit. Thank you!

## 2025-06-23 ENCOUNTER — OFFICE VISIT (OUTPATIENT)
Dept: INTERNAL MEDICINE | Age: 50
End: 2025-06-23
Payer: COMMERCIAL

## 2025-06-23 VITALS
WEIGHT: 202.2 LBS | OXYGEN SATURATION: 100 % | HEIGHT: 66 IN | SYSTOLIC BLOOD PRESSURE: 110 MMHG | DIASTOLIC BLOOD PRESSURE: 82 MMHG | BODY MASS INDEX: 32.5 KG/M2 | HEART RATE: 67 BPM

## 2025-06-23 DIAGNOSIS — Z00.00 ANNUAL PHYSICAL EXAM: Primary | ICD-10-CM

## 2025-06-23 DIAGNOSIS — Z15.89 MONOALLELIC MUTATION OF MUTYH GENE: ICD-10-CM

## 2025-06-23 DIAGNOSIS — E55.9 VITAMIN D DEFICIENCY: ICD-10-CM

## 2025-06-23 DIAGNOSIS — Z12.31 ENCOUNTER FOR SCREENING MAMMOGRAM FOR MALIGNANT NEOPLASM OF BREAST: ICD-10-CM

## 2025-06-23 DIAGNOSIS — I10 PRIMARY HYPERTENSION: ICD-10-CM

## 2025-06-23 DIAGNOSIS — G24.5 EYE TWITCH: ICD-10-CM

## 2025-06-23 DIAGNOSIS — R25.1 TREMOR: ICD-10-CM

## 2025-06-23 PROCEDURE — 99396 PREV VISIT EST AGE 40-64: CPT

## 2025-06-23 PROCEDURE — 99214 OFFICE O/P EST MOD 30 MIN: CPT

## 2025-06-23 NOTE — ASSESSMENT & PLAN NOTE
Mammogram-8/2024  Monthly SBE counseled  PAP-pending in August  Colon Cancer [id number] years  Flu vaccine-counseled  COVID-19 vaccine-counseled  TDAP-counseling  Recommend regular dental/eye exams, regular exercise, healthy diet.

## 2025-06-23 NOTE — PROGRESS NOTES
Chief Complaint  Annual Exam (Still having trouble with head )    History of Present Illness  SUBJECTIVE  Pinky hCacon presents to [facility] for her annual physical exam.    History of Present Illness  The patient presents for head tremor, eye twitching, neck pain, weight management, and health maintenance.    She continues to experience intermittent head tremors, despite previous CT scans of the head and neck showing no abnormalities. She has been gradually reducing her Wellbutrin dosage to 50 mg every other day, with plans to discontinue it completely this week. She reports feeling well overall but expresses concern about potential agitation and energy levels once off the medication. She is aware that levothyroxine can cause head tremors but is unable to stop this medication. She has also researched Wegovy as a potential cause but found no evidence to support this.    Additionally, she experienced a persistent twitch in her left eye for approximately 3 weeks, which has since subsided but still occurs sporadically. This symptom coincided with the end of her school year when she was frequently using a computer. She plans to schedule an appointment with an ophthalmologist for further evaluation. Her last eye examination was in 12/2024, during which she underwent an eye scan. She currently uses glasses for distance vision but not for close-up tasks, and she suspects she may be experiencing some eye strain.    She also reports a sharp, shooting pain in her neck that occurs during certain workout movements, which she believes may be related to a minor neck injury or pinched nerve. She has a history of lipomas in her neck and has noticed one area that feels harder than the others.    She is currently on a maintenance dose of Wegovy 1 mg but plans to increase to 1.7 mg after her upcoming cruise. She has lost 100 pounds and is committed to maintaining her weight loss. She has an  appointment with her bariatric doctor in 08/2025.    She is due for a mammogram in 08/2025 and has a gynecology appointment scheduled for the same month. She has undergone genetic testing, which revealed a single MSH2 gene mutation associated with colon cancer. She had an initial colonoscopy and was advised to repeat the procedure in 5 years, although her genetic testing recommended more frequent screenings. She occasionally experiences constipation and minor bleeding. She maintains regular dental and eye exams, exercises regularly, and strives to maintain a healthy diet.        Past Medical History:   Diagnosis Date    Allergic rhinitis     Anemia 03/28/2014    IN PAST W HEAVY PERIODS; CURRENTLY FINE; IRON DEFICIENCY    Anxiety and depression 1/11/2021    BRBPR (bright red blood per rectum) 08/17/2023    Cholelithiasis     Removed    Cyst of right ovary 10/31/2022    Disease of thyroid gland     Edema     GERD (gastroesophageal reflux disease)     Hypertension 05/30/2017    Hypothyroidism 06/19/2018    Insulin resistance     Low back pain     Obesity, Class III, BMI 40-49.9 (morbid obesity) 01/20/2021    PONV (postoperative nausea and vomiting)     Seasonal allergies     Vitamin D deficiency 03/17/2015    Vitamin deficiency     Yeast infection of the skin     UNDER BRALINE OCCASIONALLY      Family History   Problem Relation Age of Onset    Obesity Father     Diabetes Father         PREDIABETIC    Hypertension Father     Heart disease Father     Hyperlipidemia Father     Asthma Father     COPD Father     Hypertension Mother     Cancer Mother         PANCREATIC    Heart disease Mother     Thyroid disease Mother     Hyperlipidemia Mother     Heart attack Paternal Grandfather     Heart disease Paternal Grandfather     Hypertension Paternal Grandfather     Hypertension Paternal Grandmother     Heart attack Paternal Grandmother     Heart disease Paternal Grandmother     Hyperlipidemia Paternal Grandmother     Obesity  Maternal Grandmother     Cancer Maternal Grandmother         LEUKEMIA    Heart disease Maternal Grandmother     Hypertension Maternal Grandmother     Heart disease Maternal Grandfather     COPD Maternal Grandfather     Hypertension Maternal Grandfather     Breast cancer Paternal Aunt 40    Hyperlipidemia Other     Hypertension Other     Heart disease Other     Leukemia Other     Malig Hyperthermia Neg Hx     Ovarian cancer Neg Hx     Uterine cancer Neg Hx     Colon cancer Neg Hx     Melanoma Neg Hx     Prostate cancer Neg Hx       Past Surgical History:   Procedure Laterality Date    BARIATRIC SURGERY      COLONOSCOPY N/A 09/05/2023    Procedure: COLONOSCOPY;  Surgeon: Yo Parker MD;  Location: [facility] ENDOSCOPY;  Service: General;  Laterality: N/A;  DIVERTICULOSIS    ENDOMETRIAL ABLATION  2017    ENDOSCOPY N/A 03/02/2021    Procedure: ESOPHAGOGASTRODUODENOSCOPY WITH BIOPSY;  Surgeon: Jason Day Jr., MD;  Location: [facility] ENDOSCOPY;  Service: General;  Laterality: N/A;  PRE- GERD  POST-GASTRITIS    ENDOSCOPY N/A 09/05/2023    Procedure: ESOPHAGOGASTRODUODENOSCOPY WITH BIOPSIES;  Surgeon: Yo Parker MD;  Location: [facility] ENDOSCOPY;  Service: General;  Laterality: N/A;  MILD GASTRITIS    GASTRIC SLEEVE LAPAROSCOPIC N/A 03/29/2021    Procedure: GASTRIC SLEEVE LAPAROSCOPIC;  Surgeon: Jason Day Jr., MD;  Location: [facility] OR [facility];  Service: Bariatric;  Laterality: N/A;    LAPAROSCOPIC CHOLECYSTECTOMY  2013    WISDOM TOOTH EXTRACTION  1993        Current Outpatient Medications:     cetirizine (zyrTEC) 10 MG tablet, Take 1 tablet by mouth Daily As Needed (as needed)., Disp: 90 tablet, Rfl: 3    famotidine (PEPCID) 20 MG tablet, Take 1 tablet by mouth Every Night., Disp: 90 tablet, Rfl: 3    levothyroxine (SYNTHROID, LEVOTHROID) 50 MCG tablet, Take 1 tablet by mouth Daily., Disp: 90 tablet, Rfl: 3    lisinopril (PRINIVIL,ZESTRIL) 5 MG tablet, Take 1 tablet by mouth Daily., Disp: 90 tablet, Rfl:  "3    Magnesium Oxide 400 (240 Mg) MG tablet, Take 1 tablet by mouth Daily., Disp: , Rfl:     tretinoin (RETIN-A) 0.1 % cream, Apply 1 Application topically to the appropriate area as directed Every Night. With Hyaluronic Acid added, Disp: , Rfl:     vitamin D3 125 MCG (5000 UT) capsule capsule, Take 1 capsule by mouth Daily., Disp: 90 capsule, Rfl: 3    Wegovy 1.7 MG/0.75ML solution auto-injector, Inject 0.75 mL under the skin into the appropriate area as directed 1 (One) Time Per Week., Disp: 4 mL, Rfl: 0    OBJECTIVE  Vital Signs:   /82   Pulse 67   Ht 167.6 cm (65.98\")   Wt 91.7 kg (202 lb 3.2 oz)   SpO2 100%   BMI 32.65 kg/m²    Estimated body mass index is 32.65 kg/m² as calculated from the following:    Height as of this encounter: 167.6 cm (65.98\").    Weight as of this encounter: 91.7 kg (202 lb 3.2 oz).     Wt Readings from Last 3 Encounters:   06/23/25 91.7 kg (202 lb 3.2 oz)   02/21/25 90.1 kg (198 lb 9.6 oz)   02/04/25 90.7 kg (200 lb)     BP Readings from Last 3 Encounters:   06/23/25 110/82   02/21/25 130/78   10/22/24 118/67       Physical Exam  Vitals and nursing note reviewed.   Constitutional:       Appearance: Normal appearance.   HENT:      Head: Normocephalic.      Right Ear: Tympanic membrane normal.      Left Ear: Tympanic membrane normal.   Eyes:      Extraocular Movements: Extraocular movements intact.      Conjunctiva/sclera: Conjunctivae normal.   Cardiovascular:      Rate and Rhythm: Normal rate and regular rhythm.      Heart sounds: Normal heart sounds. No murmur heard.  Pulmonary:      Effort: Pulmonary effort is normal.      Breath sounds: Normal breath sounds. No wheezing or rales.   Abdominal:      General: Bowel sounds are normal.      Palpations: Abdomen is soft.      Tenderness: There is no abdominal tenderness. There is no guarding.   Musculoskeletal:         General: No swelling. Normal range of motion.      Cervical back: Normal range of motion and neck supple. "   Skin:     General: Skin is warm and dry.   Neurological:      General: No focal deficit present.      Mental Status: She is alert and oriented to person, place, and time. Mental status is at baseline.   Psychiatric:         Mood and Affect: Mood normal.         Behavior: Behavior normal.         Thought Content: Thought content normal.         Judgment: Judgment normal.          Result Review        CT Lumbar Spine Without Contrast  Result Date: 3/26/2025  Impression: 1. Mild lumbar spondylosis without acute abnormality. 2. No visualized lipoma. Electronically Signed: Nnamdi Lauren MD  3/26/2025 12:58 PM EDT  Workstation ID: [id number]    CT Soft Tissue Neck With Contrast  Result Date: 3/10/2025  Impression: Palpable marker is noted posteriorly correlate to underlying subcutaneous fat, possibly representing lipomas. There is no evidence of suspicious solid or enhancing component. Otherwise normal CT of the neck soft tissues as above. Electronically Signed: Antonio Ma MD  3/10/2025 9:21 PM EDT  Workstation ID: [id number]    CT Head Without Contrast  Result Date: 3/7/2025  No acute intracranial abnormality. Electronically Signed: Omar Kerns MD  3/7/2025 9:37 PM EST  Workstation ID: [id number]       The above data has been reviewed by BUDDY Garcia 06/23/2025 09:37 EDT.          Patient Care Team:  Helga Day APRN as PCP - General (Internal Medicine)  Jason Day Jr., MD as Surgeon (Bariatrics)            ASSESSMENT & PLAN    Diagnoses and all orders for this visit:    1. Annual physical exam (Primary)  Assessment & Plan:  Mammogram-8/2024  Monthly SBE counseled  PAP-pending in August  Colon Cancer [id number] years  Flu vaccine-counseled  COVID-19 vaccine-counseled  TDAP-counseling  Recommend regular dental/eye exams, regular exercise, healthy diet.    Orders:  -     CBC & Differential; Future  -     Comprehensive Metabolic Panel; Future  -     Lipid Panel; Future  -     Urinalysis With  Microscopic - Urine, Clean Catch; Future  -     TSH Rfx On Abnormal To Free T4; Future  -     Vitamin B12 & Folate; Future    2. Primary hypertension    3. Eye twitch  -     MRI Brain With & Without Contrast; Future  -     Ambulatory Referral to Neurology    4. Tremor  -     MRI Brain With & Without Contrast; Future  -     Ambulatory Referral to Neurology    5. Encounter for screening mammogram for malignant neoplasm of breast  -     Mammo Screening Digital Tomosynthesis Bilateral With CAD; Future    6. Monoallelic mutation of MUTYH gene  -     Urinalysis With Microscopic - Urine, Clean Catch; Future    7. Vitamin D deficiency  -     Vitamin D,25-Hydroxy; Future         Tobacco Use: Low Risk  (6/23/2025)    Patient History     Smoking Tobacco Use: Never     Smokeless Tobacco Use: Never     Passive Exposure: Never       Assessment & Plan  1. Head tremor.  - Reports ongoing head tremors despite previous head and neck CT scans showing no significant findings.  - MRI of the brain with and without contrast will be ordered to further investigate the cause of the tremors.  - Referral to a neurologist at [place] or [facility] in [facility] will be made for a comprehensive evaluation.  - Discussion included the possibility of essential tremors, considering family history.    2. Eye twitching.  - Experienced consistent left eye twitching for about three weeks, which has since become periodic.  - Occurred around the end of the school year with extensive computer use.  - MRI of the brain with and without contrast will be ordered to further investigate.  - Referral to a neurologist will be made for a comprehensive evaluation.    3. Neck pain.  - Reports a sharp pain shooting up her neck during workouts involving extension.  - Possible neurologic sign or related to a minor neck injury or pinched nerve.  - Will be discussed with the neurologist during the referral visit.  - Previous neck CT showed lipomas, but patient notes a  harder spot of concern.    4. Weight management.  - Currently on Wegovy 1 mg and plans to switch to 1.7 mg after her upcoming cruise.  - Lost 100 pounds and aims to maintain weight loss.  - Will continue with the current regimen and follow up with the bariatric doctor in August.  - Concerns about weight creeping back up, especially with upcoming trips.    5. Health maintenance.  - Due for a mammogram in August.  - Regular dental and eye exams, exercise, and healthy eating habits will continue.  - Blood work orders will be placed for the next visit.  - Tetanus vaccine discussed, will consider Tdap in the future for pertussis protection.    Follow-up  - Follow up in 6 months at the new office.      Follow Up     Return in about 6 months (around 12/23/2025) for Recheck.    Please note that portions of this note were completed with a voice recognition program.    Patient was given instructions and counseling regarding her condition or for health maintenance advice. Please see specific information pulled into the AVS if appropriate.   I have reviewed information obtained and documented by others and I have confirmed the accuracy of this documented note.    BUDDY Garcia    Patient or patient representative verbalized consent for the use of Ambient Listening during the visit with  BUDDY Garcia for chart documentation. 6/23/2025  10:49 EDT

## 2025-07-03 ENCOUNTER — TELEPHONE (OUTPATIENT)
Dept: INTERNAL MEDICINE | Age: 50
End: 2025-07-03
Payer: COMMERCIAL

## 2025-07-03 DIAGNOSIS — G25.0 BENIGN HEAD TREMOR: Primary | ICD-10-CM

## 2025-07-03 NOTE — TELEPHONE ENCOUNTER
Referral was denied for MRI Brain with Contrast. In the denial it stated it needed to be MRI Guided Focus Ultra-Sound.

## 2025-07-03 NOTE — TELEPHONE ENCOUNTER
Spoke with radiology and [facility]. They want to do a peer to peer. Would you like to refer to neurology and see if they can get the MRI done?

## 2025-07-08 RX ORDER — SEMAGLUTIDE 1.7 MG/.75ML
INJECTION, SOLUTION SUBCUTANEOUS
Qty: 8 ML | Refills: 0 | Status: SHIPPED | OUTPATIENT
Start: 2025-07-08

## 2025-08-04 ENCOUNTER — OFFICE VISIT (OUTPATIENT)
Dept: OBSTETRICS AND GYNECOLOGY | Age: 50
End: 2025-08-04
Payer: COMMERCIAL

## 2025-08-04 VITALS
BODY MASS INDEX: 32.95 KG/M2 | SYSTOLIC BLOOD PRESSURE: 123 MMHG | HEART RATE: 76 BPM | HEIGHT: 66 IN | DIASTOLIC BLOOD PRESSURE: 82 MMHG | WEIGHT: 205 LBS

## 2025-08-04 DIAGNOSIS — Z91.89 INCREASED RISK OF BREAST CANCER: ICD-10-CM

## 2025-08-04 DIAGNOSIS — Z15.09 BRCA GENE MUTATION TEST POSITIVE: ICD-10-CM

## 2025-08-04 DIAGNOSIS — Z01.419 WELL WOMAN EXAM WITH ROUTINE GYNECOLOGICAL EXAM: Primary | ICD-10-CM

## 2025-08-04 DIAGNOSIS — Z15.01 BRCA GENE MUTATION TEST POSITIVE: ICD-10-CM

## 2025-08-04 DIAGNOSIS — Z15.89 MONOALLELIC MUTATION OF MUTYH GENE: ICD-10-CM

## 2025-08-04 PROBLEM — N93.9 ABNORMAL UTERINE BLEEDING (AUB): Status: RESOLVED | Noted: 2022-10-26 | Resolved: 2025-08-04

## 2025-08-04 PROCEDURE — G0123 SCREEN CERV/VAG THIN LAYER: HCPCS | Performed by: OBSTETRICS & GYNECOLOGY

## 2025-08-04 PROCEDURE — 99212 OFFICE O/P EST SF 10 MIN: CPT | Performed by: OBSTETRICS & GYNECOLOGY

## 2025-08-04 PROCEDURE — 99396 PREV VISIT EST AGE 40-64: CPT | Performed by: OBSTETRICS & GYNECOLOGY

## 2025-08-04 PROCEDURE — 87624 HPV HI-RISK TYP POOLED RSLT: CPT | Performed by: OBSTETRICS & GYNECOLOGY

## 2025-08-04 RX ORDER — CHLORAL HYDRATE 500 MG
CAPSULE ORAL
COMMUNITY
Start: 2025-06-01

## 2025-08-06 LAB
CYTOLOGIST CVX/VAG CYTO: NORMAL
CYTOLOGY CVX/VAG DOC CYTO: NORMAL
CYTOLOGY CVX/VAG DOC THIN PREP: NORMAL
DX ICD CODE: NORMAL
HPV I/H RISK 4 DNA CVX QL PROBE+SIG AMP: NEGATIVE
OTHER STN SPEC: NORMAL
SERVICE CMNT-IMP: NORMAL
STAT OF ADQ CVX/VAG CYTO-IMP: NORMAL

## 2025-08-18 ENCOUNTER — OFFICE VISIT (OUTPATIENT)
Dept: NEUROLOGY | Facility: CLINIC | Age: 50
End: 2025-08-18
Payer: COMMERCIAL

## 2025-08-18 VITALS
HEART RATE: 66 BPM | DIASTOLIC BLOOD PRESSURE: 85 MMHG | SYSTOLIC BLOOD PRESSURE: 120 MMHG | WEIGHT: 204 LBS | HEIGHT: 66 IN | BODY MASS INDEX: 32.78 KG/M2

## 2025-08-18 DIAGNOSIS — G25.0 BENIGN HEAD TREMOR: Primary | ICD-10-CM

## 2025-08-22 RX ORDER — SEMAGLUTIDE 1.7 MG/.75ML
INJECTION, SOLUTION SUBCUTANEOUS
Qty: 9 ML | Refills: 0 | Status: SHIPPED | OUTPATIENT
Start: 2025-08-22

## (undated) DEVICE — TUBING, SUCTION, 1/4" X 10', STRAIGHT: Brand: MEDLINE

## (undated) DEVICE — MARKR SKIN W/RULR AND LBL

## (undated) DEVICE — APL DUPLOSPRAYER MIS 40CM

## (undated) DEVICE — KTTNER ENDO BLNT DISSCT

## (undated) DEVICE — CLMP STD 22CM DISP

## (undated) DEVICE — CONN TBG Y 5 IN 1 LF STRL

## (undated) DEVICE — GLV SURG SENSICARE PI MIC PF SZ8.5 LF STRL

## (undated) DEVICE — Device: Brand: DEFENDO AIR/WATER/SUCTION AND BIOPSY VALVE

## (undated) DEVICE — 500ML,PRESSURE INFUSER W/STOPCOCK: Brand: MEDLINE

## (undated) DEVICE — GLV SURG SENSICARE PI LF PF 8 GRN STRL

## (undated) DEVICE — BALN STD BOUGIE 18F

## (undated) DEVICE — SOL IRRG H2O PL/BG 1000ML STRL

## (undated) DEVICE — MSK PROC CURAPLEX O2 2/ADAPT 7FT

## (undated) DEVICE — PK OSC LAP SLV 40

## (undated) DEVICE — KT ORCA ORCAPOD DISP STRL

## (undated) DEVICE — VIOLET BRAIDED (POLYGLACTIN 910), SYNTHETIC ABSORBABLE SUTURE: Brand: COATED VICRYL

## (undated) DEVICE — LINER SURG CANSTR SXN S/RIGD 1500CC

## (undated) DEVICE — 30977 SEE SHARP - ENHANCED INTRAOPERATIVE LAPAROSCOPE CLEANING & DEFOGGING: Brand: 30977 SEE SHARP - ENHANCED INTRAOPERATIVE LAPAROSCOPE CLEANING & DEFOGGING

## (undated) DEVICE — ECHELON FLEX POWERED PLUS LONG ARTICULATING ENDOSCOPIC LINEAR CUTTER, 60MM: Brand: ECHELON FLEX

## (undated) DEVICE — FRCP BX RADJAW4 NDL 2.8 240CM LG OG BX40

## (undated) DEVICE — SOL IRR NACL 0.9PCT BT 1000ML

## (undated) DEVICE — GLV SURG SENSICARE PI PF LF 8.5 GRN STRL

## (undated) DEVICE — ENDOPATH XCEL BLADELESS TROCARS WITH STABILITY SLEEVES: Brand: ENDOPATH XCEL

## (undated) DEVICE — CONN JET HYDRA H20 AUXILIARY DISP

## (undated) DEVICE — BITEBLOCK OMNI BLOC

## (undated) DEVICE — SENSR O2 OXIMAX FNGR A/ 18IN NONSTR

## (undated) DEVICE — LAPAROSCOPIC SMOKE FILTRATION SYSTEM: Brand: PALL LAPAROSHIELD® PLUS LAPAROSCOPIC SMOKE FILTRATION SYSTEM

## (undated) DEVICE — SOLIDIFIER LIQLOC PLS 1500CC BT

## (undated) DEVICE — SINGLE-USE BIOPSY FORCEPS: Brand: RADIAL JAW 4

## (undated) DEVICE — Device

## (undated) DEVICE — GLV SURG BIOGEL LTX PF 7 1/2

## (undated) DEVICE — ADAPT CLN BIOGUARD AIR/H2O DISP

## (undated) DEVICE — LN SMPL CO2 SHTRM SD STREAM W/M LUER

## (undated) DEVICE — BLCK/BITE BLOX WO/DENTL/RIM W/STRAP 54F

## (undated) DEVICE — ENSEAL LAPAROSCOPIC TISSUE SEALER G2 ARTICULATING CURVED JAW FOR USE WITH G2 GENERATOR 5MM DIAMETER 45CM SHAFT LENGTH: Brand: ENSEAL